# Patient Record
Sex: FEMALE | Race: BLACK OR AFRICAN AMERICAN | NOT HISPANIC OR LATINO | Employment: FULL TIME | URBAN - METROPOLITAN AREA
[De-identification: names, ages, dates, MRNs, and addresses within clinical notes are randomized per-mention and may not be internally consistent; named-entity substitution may affect disease eponyms.]

---

## 2021-02-23 ENCOUNTER — APPOINTMENT (OUTPATIENT)
Dept: GENERAL RADIOLOGY | Facility: CLINIC | Age: 26
DRG: 896 | End: 2021-02-23
Attending: EMERGENCY MEDICINE

## 2021-02-23 ENCOUNTER — APPOINTMENT (OUTPATIENT)
Dept: CT IMAGING | Facility: CLINIC | Age: 26
DRG: 896 | End: 2021-02-23
Attending: EMERGENCY MEDICINE

## 2021-02-23 ENCOUNTER — HOSPITAL ENCOUNTER (INPATIENT)
Facility: CLINIC | Age: 26
LOS: 2 days | Discharge: HOME | DRG: 896 | End: 2021-02-25
Attending: EMERGENCY MEDICINE | Admitting: INTERNAL MEDICINE

## 2021-02-23 DIAGNOSIS — R40.1 STUPOR: ICD-10-CM

## 2021-02-23 DIAGNOSIS — F10.929 ALCOHOLIC INTOXICATION WITH COMPLICATION (H): ICD-10-CM

## 2021-02-23 LAB
ALBUMIN SERPL-MCNC: 4.1 G/DL (ref 3.4–5)
ALBUMIN UR-MCNC: NEGATIVE MG/DL
ALP SERPL-CCNC: 46 U/L (ref 40–150)
ALT SERPL W P-5'-P-CCNC: 26 U/L (ref 0–50)
AMPHETAMINES UR QL SCN: NEGATIVE
ANION GAP SERPL CALCULATED.3IONS-SCNC: 10 MMOL/L (ref 3–14)
ANION GAP SERPL CALCULATED.3IONS-SCNC: 12 MMOL/L (ref 3–14)
APAP SERPL-MCNC: <2 MG/L (ref 10–20)
APPEARANCE UR: CLEAR
AST SERPL W P-5'-P-CCNC: 23 U/L (ref 0–45)
B-HCG FREE SERPL-ACNC: <5 IU/L
BARBITURATES UR QL: NEGATIVE
BASE DEFICIT BLDA-SCNC: 2.4 MMOL/L
BASOPHILS # BLD AUTO: 0 10E9/L (ref 0–0.2)
BASOPHILS NFR BLD AUTO: 0.4 %
BENZODIAZ UR QL: NEGATIVE
BILIRUB SERPL-MCNC: 0.5 MG/DL (ref 0.2–1.3)
BILIRUB UR QL STRIP: NEGATIVE
BUN SERPL-MCNC: 11 MG/DL (ref 7–30)
BUN SERPL-MCNC: 13 MG/DL (ref 7–30)
CALCIUM SERPL-MCNC: 8.1 MG/DL (ref 8.5–10.1)
CALCIUM SERPL-MCNC: 8.9 MG/DL (ref 8.5–10.1)
CANNABINOIDS UR QL SCN: NEGATIVE
CHLORIDE SERPL-SCNC: 105 MMOL/L (ref 94–109)
CHLORIDE SERPL-SCNC: 110 MMOL/L (ref 94–109)
CO2 SERPL-SCNC: 20 MMOL/L (ref 20–32)
CO2 SERPL-SCNC: 21 MMOL/L (ref 20–32)
COCAINE UR QL: NEGATIVE
COLOR UR AUTO: NORMAL
CREAT SERPL-MCNC: 0.7 MG/DL (ref 0.52–1.04)
CREAT SERPL-MCNC: 0.82 MG/DL (ref 0.52–1.04)
DIFFERENTIAL METHOD BLD: ABNORMAL
EOSINOPHIL # BLD AUTO: 0.1 10E9/L (ref 0–0.7)
EOSINOPHIL NFR BLD AUTO: 1.2 %
ERYTHROCYTE [DISTWIDTH] IN BLOOD BY AUTOMATED COUNT: 13.2 % (ref 10–15)
ERYTHROCYTE [DISTWIDTH] IN BLOOD BY AUTOMATED COUNT: 13.2 % (ref 10–15)
ETHANOL SERPL-MCNC: 0.22 G/DL
FLUAV RNA RESP QL NAA+PROBE: NEGATIVE
FLUBV RNA RESP QL NAA+PROBE: NEGATIVE
GFR SERPL CREATININE-BSD FRML MDRD: >90 ML/MIN/{1.73_M2}
GFR SERPL CREATININE-BSD FRML MDRD: >90 ML/MIN/{1.73_M2}
GLUCOSE BLDC GLUCOMTR-MCNC: 84 MG/DL (ref 70–99)
GLUCOSE BLDC GLUCOMTR-MCNC: 94 MG/DL (ref 70–99)
GLUCOSE BLDC GLUCOMTR-MCNC: 98 MG/DL (ref 70–99)
GLUCOSE SERPL-MCNC: 74 MG/DL (ref 70–99)
GLUCOSE SERPL-MCNC: 98 MG/DL (ref 70–99)
GLUCOSE UR STRIP-MCNC: NEGATIVE MG/DL
HCO3 BLD-SCNC: 21 MMOL/L (ref 21–28)
HCT VFR BLD AUTO: 41.8 % (ref 35–47)
HCT VFR BLD AUTO: 46.5 % (ref 35–47)
HGB BLD-MCNC: 13.1 G/DL (ref 11.7–15.7)
HGB BLD-MCNC: 14.3 G/DL (ref 11.7–15.7)
HGB UR QL STRIP: NEGATIVE
IMM GRANULOCYTES # BLD: 0 10E9/L (ref 0–0.4)
IMM GRANULOCYTES NFR BLD: 0.3 %
KETONES UR STRIP-MCNC: NEGATIVE MG/DL
LABORATORY COMMENT REPORT: NORMAL
LEUKOCYTE ESTERASE UR QL STRIP: NEGATIVE
LYMPHOCYTES # BLD AUTO: 1.8 10E9/L (ref 0.8–5.3)
LYMPHOCYTES NFR BLD AUTO: 23.6 %
MAGNESIUM SERPL-MCNC: 2.1 MG/DL (ref 1.6–2.3)
MCH RBC QN AUTO: 25.7 PG (ref 26.5–33)
MCH RBC QN AUTO: 25.8 PG (ref 26.5–33)
MCHC RBC AUTO-ENTMCNC: 30.8 G/DL (ref 31.5–36.5)
MCHC RBC AUTO-ENTMCNC: 31.3 G/DL (ref 31.5–36.5)
MCV RBC AUTO: 82 FL (ref 78–100)
MCV RBC AUTO: 84 FL (ref 78–100)
MONOCYTES # BLD AUTO: 0.5 10E9/L (ref 0–1.3)
MONOCYTES NFR BLD AUTO: 6 %
NEUTROPHILS # BLD AUTO: 5.4 10E9/L (ref 1.6–8.3)
NEUTROPHILS NFR BLD AUTO: 68.5 %
NITRATE UR QL: NEGATIVE
NRBC # BLD AUTO: 0 10*3/UL
NRBC BLD AUTO-RTO: 0 /100
O2/TOTAL GAS SETTING VFR VENT: ABNORMAL %
OPIATES UR QL SCN: NEGATIVE
OXYHGB MFR BLD: 99 % (ref 92–100)
PCO2 BLD: 30 MM HG (ref 35–45)
PCP UR QL SCN: NEGATIVE
PH BLD: 7.45 PH (ref 7.35–7.45)
PH UR STRIP: 5.5 PH (ref 5–7)
PLATELET # BLD AUTO: 202 10E9/L (ref 150–450)
PLATELET # BLD AUTO: 327 10E9/L (ref 150–450)
PO2 BLD: 212 MM HG (ref 80–105)
POTASSIUM SERPL-SCNC: 3.5 MMOL/L (ref 3.4–5.3)
POTASSIUM SERPL-SCNC: 4.2 MMOL/L (ref 3.4–5.3)
PROT SERPL-MCNC: 8.1 G/DL (ref 6.8–8.8)
RBC # BLD AUTO: 5.09 10E12/L (ref 3.8–5.2)
RBC # BLD AUTO: 5.55 10E12/L (ref 3.8–5.2)
RBC #/AREA URNS AUTO: 1 /HPF (ref 0–2)
RSV RNA SPEC QL NAA+PROBE: NORMAL
SALICYLATES SERPL-MCNC: <2 MG/DL
SARS-COV-2 RNA RESP QL NAA+PROBE: NEGATIVE
SODIUM SERPL-SCNC: 138 MMOL/L (ref 133–144)
SODIUM SERPL-SCNC: 140 MMOL/L (ref 133–144)
SOURCE: NORMAL
SP GR UR STRIP: 1.01 (ref 1–1.03)
SPECIMEN SOURCE: NORMAL
UROBILINOGEN UR STRIP-MCNC: NORMAL MG/DL (ref 0–2)
WBC # BLD AUTO: 13.6 10E9/L (ref 4–11)
WBC # BLD AUTO: 7.8 10E9/L (ref 4–11)
WBC #/AREA URNS AUTO: 1 /HPF (ref 0–5)

## 2021-02-23 PROCEDURE — 250N000009 HC RX 250: Performed by: INTERNAL MEDICINE

## 2021-02-23 PROCEDURE — 87636 SARSCOV2 & INF A&B AMP PRB: CPT | Performed by: EMERGENCY MEDICINE

## 2021-02-23 PROCEDURE — 81001 URINALYSIS AUTO W/SCOPE: CPT | Performed by: EMERGENCY MEDICINE

## 2021-02-23 PROCEDURE — 93005 ELECTROCARDIOGRAM TRACING: CPT

## 2021-02-23 PROCEDURE — 999N000105 HC STATISTIC NO DOCUMENTATION TO SUPPORT CHARGE

## 2021-02-23 PROCEDURE — 99291 CRITICAL CARE FIRST HOUR: CPT | Performed by: INTERNAL MEDICINE

## 2021-02-23 PROCEDURE — 80053 COMPREHEN METABOLIC PANEL: CPT | Performed by: EMERGENCY MEDICINE

## 2021-02-23 PROCEDURE — 82805 BLOOD GASES W/O2 SATURATION: CPT | Performed by: EMERGENCY MEDICINE

## 2021-02-23 PROCEDURE — 96365 THER/PROPH/DIAG IV INF INIT: CPT

## 2021-02-23 PROCEDURE — 94002 VENT MGMT INPAT INIT DAY: CPT

## 2021-02-23 PROCEDURE — 99292 CRITICAL CARE ADDL 30 MIN: CPT

## 2021-02-23 PROCEDURE — 84702 CHORIONIC GONADOTROPIN TEST: CPT

## 2021-02-23 PROCEDURE — 99291 CRITICAL CARE FIRST HOUR: CPT | Mod: 25

## 2021-02-23 PROCEDURE — 80048 BASIC METABOLIC PNL TOTAL CA: CPT | Performed by: INTERNAL MEDICINE

## 2021-02-23 PROCEDURE — 80179 DRUG ASSAY SALICYLATE: CPT | Performed by: EMERGENCY MEDICINE

## 2021-02-23 PROCEDURE — 96375 TX/PRO/DX INJ NEW DRUG ADDON: CPT

## 2021-02-23 PROCEDURE — 250N000013 HC RX MED GY IP 250 OP 250 PS 637: Performed by: INTERNAL MEDICINE

## 2021-02-23 PROCEDURE — 999N001017 HC STATISTIC GLUCOSE BY METER IP

## 2021-02-23 PROCEDURE — 96376 TX/PRO/DX INJ SAME DRUG ADON: CPT

## 2021-02-23 PROCEDURE — 0BH17EZ INSERTION OF ENDOTRACHEAL AIRWAY INTO TRACHEA, VIA NATURAL OR ARTIFICIAL OPENING: ICD-10-PCS | Performed by: EMERGENCY MEDICINE

## 2021-02-23 PROCEDURE — 5A1935Z RESPIRATORY VENTILATION, LESS THAN 24 CONSECUTIVE HOURS: ICD-10-PCS | Performed by: EMERGENCY MEDICINE

## 2021-02-23 PROCEDURE — 96366 THER/PROPH/DIAG IV INF ADDON: CPT

## 2021-02-23 PROCEDURE — 250N000011 HC RX IP 250 OP 636: Performed by: EMERGENCY MEDICINE

## 2021-02-23 PROCEDURE — 999N000157 HC STATISTIC RCP TIME EA 10 MIN

## 2021-02-23 PROCEDURE — 31500 INSERT EMERGENCY AIRWAY: CPT

## 2021-02-23 PROCEDURE — 200N000001 HC R&B ICU

## 2021-02-23 PROCEDURE — 70450 CT HEAD/BRAIN W/O DYE: CPT

## 2021-02-23 PROCEDURE — 96368 THER/DIAG CONCURRENT INF: CPT

## 2021-02-23 PROCEDURE — 80307 DRUG TEST PRSMV CHEM ANLYZR: CPT | Performed by: EMERGENCY MEDICINE

## 2021-02-23 PROCEDURE — 999N000185 HC STATISTIC TRANSPORT TIME EA 15 MIN

## 2021-02-23 PROCEDURE — C9803 HOPD COVID-19 SPEC COLLECT: HCPCS

## 2021-02-23 PROCEDURE — 36600 WITHDRAWAL OF ARTERIAL BLOOD: CPT

## 2021-02-23 PROCEDURE — 51702 INSERT TEMP BLADDER CATH: CPT

## 2021-02-23 PROCEDURE — 83735 ASSAY OF MAGNESIUM: CPT | Performed by: EMERGENCY MEDICINE

## 2021-02-23 PROCEDURE — 250N000009 HC RX 250: Performed by: EMERGENCY MEDICINE

## 2021-02-23 PROCEDURE — 82077 ASSAY SPEC XCP UR&BREATH IA: CPT | Performed by: EMERGENCY MEDICINE

## 2021-02-23 PROCEDURE — 36415 COLL VENOUS BLD VENIPUNCTURE: CPT | Performed by: INTERNAL MEDICINE

## 2021-02-23 PROCEDURE — 80143 DRUG ASSAY ACETAMINOPHEN: CPT | Performed by: EMERGENCY MEDICINE

## 2021-02-23 PROCEDURE — 71045 X-RAY EXAM CHEST 1 VIEW: CPT

## 2021-02-23 PROCEDURE — 250N000011 HC RX IP 250 OP 636

## 2021-02-23 PROCEDURE — 85025 COMPLETE CBC W/AUTO DIFF WBC: CPT | Performed by: EMERGENCY MEDICINE

## 2021-02-23 PROCEDURE — 258N000003 HC RX IP 258 OP 636: Performed by: INTERNAL MEDICINE

## 2021-02-23 PROCEDURE — 85027 COMPLETE CBC AUTOMATED: CPT | Performed by: INTERNAL MEDICINE

## 2021-02-23 RX ORDER — ETOMIDATE 2 MG/ML
10 INJECTION INTRAVENOUS ONCE
Status: COMPLETED | OUTPATIENT
Start: 2021-02-23 | End: 2021-02-23

## 2021-02-23 RX ORDER — MULTIPLE VITAMINS W/ MINERALS TAB 9MG-400MCG
1 TAB ORAL DAILY
Status: DISCONTINUED | OUTPATIENT
Start: 2021-02-23 | End: 2021-02-23

## 2021-02-23 RX ORDER — LANOLIN ALCOHOL/MO/W.PET/CERES
100 CREAM (GRAM) TOPICAL DAILY
Status: DISCONTINUED | OUTPATIENT
Start: 2021-02-23 | End: 2021-02-24

## 2021-02-23 RX ORDER — ONDANSETRON 4 MG/1
4 TABLET, ORALLY DISINTEGRATING ORAL EVERY 6 HOURS PRN
Status: DISCONTINUED | OUTPATIENT
Start: 2021-02-23 | End: 2021-02-25 | Stop reason: HOSPADM

## 2021-02-23 RX ORDER — MIDAZOLAM (PF) 1 MG/ML IN 0.9 % SODIUM CHLORIDE INTRAVENOUS SOLUTION
1-8 CONTINUOUS
Status: DISCONTINUED | OUTPATIENT
Start: 2021-02-23 | End: 2021-02-23

## 2021-02-23 RX ORDER — FENTANYL CITRATE 50 UG/ML
100 INJECTION, SOLUTION INTRAMUSCULAR; INTRAVENOUS ONCE
Status: COMPLETED | OUTPATIENT
Start: 2021-02-23 | End: 2021-02-23

## 2021-02-23 RX ORDER — ACETAMINOPHEN 325 MG/1
650 TABLET ORAL EVERY 4 HOURS PRN
Status: DISCONTINUED | OUTPATIENT
Start: 2021-02-23 | End: 2021-02-25 | Stop reason: HOSPADM

## 2021-02-23 RX ORDER — NICOTINE POLACRILEX 4 MG
15-30 LOZENGE BUCCAL
Status: DISCONTINUED | OUTPATIENT
Start: 2021-02-23 | End: 2021-02-25 | Stop reason: HOSPADM

## 2021-02-23 RX ORDER — DEXTROSE MONOHYDRATE 25 G/50ML
25-50 INJECTION, SOLUTION INTRAVENOUS
Status: DISCONTINUED | OUTPATIENT
Start: 2021-02-23 | End: 2021-02-25 | Stop reason: HOSPADM

## 2021-02-23 RX ORDER — FENTANYL CITRATE 50 UG/ML
50 INJECTION, SOLUTION INTRAMUSCULAR; INTRAVENOUS
Status: DISCONTINUED | OUTPATIENT
Start: 2021-02-23 | End: 2021-02-25 | Stop reason: HOSPADM

## 2021-02-23 RX ORDER — CHLORHEXIDINE GLUCONATE ORAL RINSE 1.2 MG/ML
15 SOLUTION DENTAL EVERY 12 HOURS
Status: DISCONTINUED | OUTPATIENT
Start: 2021-02-23 | End: 2021-02-24

## 2021-02-23 RX ORDER — ALBUTEROL SULFATE 90 UG/1
6 AEROSOL, METERED RESPIRATORY (INHALATION) EVERY 4 HOURS PRN
Status: DISCONTINUED | OUTPATIENT
Start: 2021-02-23 | End: 2021-02-25 | Stop reason: HOSPADM

## 2021-02-23 RX ORDER — ONDANSETRON 2 MG/ML
4 INJECTION INTRAMUSCULAR; INTRAVENOUS EVERY 6 HOURS PRN
Status: DISCONTINUED | OUTPATIENT
Start: 2021-02-23 | End: 2021-02-25 | Stop reason: HOSPADM

## 2021-02-23 RX ORDER — SODIUM CHLORIDE 9 MG/ML
INJECTION, SOLUTION INTRAVENOUS CONTINUOUS
Status: DISCONTINUED | OUTPATIENT
Start: 2021-02-23 | End: 2021-02-24

## 2021-02-23 RX ORDER — NALOXONE HYDROCHLORIDE 0.4 MG/ML
0.2 INJECTION, SOLUTION INTRAMUSCULAR; INTRAVENOUS; SUBCUTANEOUS
Status: DISCONTINUED | OUTPATIENT
Start: 2021-02-23 | End: 2021-02-25 | Stop reason: HOSPADM

## 2021-02-23 RX ORDER — NALOXONE HYDROCHLORIDE 0.4 MG/ML
0.4 INJECTION, SOLUTION INTRAMUSCULAR; INTRAVENOUS; SUBCUTANEOUS
Status: DISCONTINUED | OUTPATIENT
Start: 2021-02-23 | End: 2021-02-25 | Stop reason: HOSPADM

## 2021-02-23 RX ORDER — PROPOFOL 10 MG/ML
INJECTION, EMULSION INTRAVENOUS
Status: COMPLETED
Start: 2021-02-23 | End: 2021-02-23

## 2021-02-23 RX ORDER — DEXMEDETOMIDINE HYDROCHLORIDE 4 UG/ML
.2-.7 INJECTION, SOLUTION INTRAVENOUS CONTINUOUS
Status: DISCONTINUED | OUTPATIENT
Start: 2021-02-23 | End: 2021-02-24

## 2021-02-23 RX ORDER — FOLIC ACID 1 MG/1
1 TABLET ORAL DAILY
Status: COMPLETED | OUTPATIENT
Start: 2021-02-23 | End: 2021-02-24

## 2021-02-23 RX ORDER — MIDAZOLAM (PF) 1 MG/ML IN 0.9 % SODIUM CHLORIDE INTRAVENOUS SOLUTION
1-8 CONTINUOUS
Status: DISCONTINUED | OUTPATIENT
Start: 2021-02-23 | End: 2021-02-24

## 2021-02-23 RX ORDER — NALOXONE HYDROCHLORIDE 0.4 MG/ML
0.5 INJECTION, SOLUTION INTRAMUSCULAR; INTRAVENOUS; SUBCUTANEOUS ONCE
Status: COMPLETED | OUTPATIENT
Start: 2021-02-23 | End: 2021-02-23

## 2021-02-23 RX ADMIN — NALOXONE HYDROCHLORIDE 0.5 MG: 0.4 INJECTION, SOLUTION INTRAMUSCULAR; INTRAVENOUS; SUBCUTANEOUS at 15:39

## 2021-02-23 RX ADMIN — FENTANYL CITRATE 100 MCG: 50 INJECTION, SOLUTION INTRAMUSCULAR; INTRAVENOUS at 15:56

## 2021-02-23 RX ADMIN — ETOMIDATE 10 MG: 20 INJECTION, SOLUTION INTRAVENOUS at 15:47

## 2021-02-23 RX ADMIN — FOLIC ACID 1 MG: 1 TABLET ORAL at 21:21

## 2021-02-23 RX ADMIN — PROPOFOL 40 MCG/KG/MIN: 10 INJECTION, EMULSION INTRAVENOUS at 16:28

## 2021-02-23 RX ADMIN — Medication 1 MG/HR: at 16:26

## 2021-02-23 RX ADMIN — SODIUM CHLORIDE: 9 INJECTION, SOLUTION INTRAVENOUS at 19:54

## 2021-02-23 RX ADMIN — PROPOFOL 40 MCG/KG/MIN: 10 INJECTION, EMULSION INTRAVENOUS at 19:32

## 2021-02-23 RX ADMIN — SODIUM CHLORIDE 0.2 MCG/KG/HR: 9 INJECTION, SOLUTION INTRAVENOUS at 21:11

## 2021-02-23 RX ADMIN — MULTIVITAMIN 15 ML: LIQUID ORAL at 21:24

## 2021-02-23 RX ADMIN — THIAMINE HCL TAB 100 MG 100 MG: 100 TAB at 21:21

## 2021-02-23 RX ADMIN — CHLORHEXIDINE GLUCONATE 0.12% ORAL RINSE 15 ML: 1.2 LIQUID ORAL at 21:15

## 2021-02-23 RX ADMIN — MIDAZOLAM 2 MG: 1 INJECTION INTRAMUSCULAR; INTRAVENOUS at 16:13

## 2021-02-23 RX ADMIN — SUCCINYLCHOLINE CHLORIDE 80 MG: 20 INJECTION, SOLUTION INTRAMUSCULAR; INTRAVENOUS at 15:44

## 2021-02-23 ASSESSMENT — MIFFLIN-ST. JEOR: SCORE: 1316

## 2021-02-23 ASSESSMENT — ACTIVITIES OF DAILY LIVING (ADL): ADLS_ACUITY_SCORE: 19

## 2021-02-23 NOTE — ED PROVIDER NOTES
History   Chief Complaint:  Altered Mental Status       The history is provided by the EMS personnel and a significant other. The history is limited by the condition of the patient.      Monica Matos is a 25 year old female who presents unresponsive. EMS report that girlfriend came home and found the patient on the couch. On scene, EMS found a liter bottle of alcohol with some gone. Girlfriend denies other drugs or substances. En route, patient was vomiting, retching, and minimal eye movement. Pupils were reactive to light. EMS deny trauma or suicidal ideation per girlfriend. The patient lives in [place] and is supposed to return tomorrow.     According to her girlfriend, the patient has a history of dissociative identity disorder. She has this person named Pancho who was out crying. The patient was drinking in the park when she messaged her girlfriend to come pick her up. When she got there the patient was intoxicated and throwing up blood. She denies any suicidal remarks.     Review of Systems   Unable to perform ROS: Patient unresponsive       Allergies:  The patient has no known allergies.     Medications:  The patient is not currently taking any prescribed medications.    Past Medical History:    The patient is unable to provide past medical history secondary to intubation.     Social History:  Presents to the ED via EMS.  Lives in [place].   Here visiting girlfriend    Physical Exam     Patient Vitals for the past 24 hrs:   BP Temp Temp src Pulse Resp SpO2 Weight   02/23/21 1730 121/76 97.2  F (36.2  C) -- 73 20 100 % --   02/23/21 1704 120/77 96.6  F (35.9  C) -- 74 20 100 % --   02/23/21 1635 -- 97.7  F (36.5  C) Temporal -- -- -- --   02/23/21 1630 118/74 -- -- 80 20 100 % --   02/23/21 1612 114/74 -- -- 90 20 100 % --   02/23/21 1600 120/76 -- -- 88 -- -- --   02/23/21 1551 -- -- -- 84 -- 100 % --   02/23/21 1550 (!) 168/103 -- -- 99 12 100 % --   02/23/21 1545 (!) 144/95 -- -- 91 (!) 40 100 %  --   02/23/21 1542 -- -- -- -- -- -- 60 kg (132 lb 4.4 oz)   02/23/21 1535 (!) 139/93 -- -- 78 11 100 % --       Physical Exam  General: Unresponsive. Vomit in hair.     HENT: Head and neck atraumatic. Mucous membranes moist. Nasal trumpet in place.     Cardiovascular: Regular rate and rhythm. Good pulses in all four extremities. Normal capillary refill and skin turgor.     Respiratory: Lungs are clear. No nasal flaring. No retractions. No wheezing, no crackles.    Gastrointestinal: Abdomen soft. No guarding, no rebound. No distention. No palpable hernias.     Musculoskeletal: No gross deformity.     Skin: No rashes or petechiae.     Neurologic: GCS 4. No seizure like activity. Minimal tone was present.    Lymphatic: No cervical adenopathy. No lower extremity swelling.    Emergency Department Course   ECG  ECG taken at 1540, ECG read at 1550  Normal sinus rhythm. Right atrial enlargement. Borderline ECG.  Rate 87 bpm. DC interval 168 ms. QRS duration 88 ms. QT/QTc 340/409 ms. P-R-T axes 84 80 36.     Imaging:  XR Chest, Portable, G/E 1 view:   Portable chest. Lungs are clear. Nasogastric tube extends  below left hemidiaphragm presumably in the stomach. Endotracheal tube  terminates approximately 3 cm above the douglas in good position. As per radiology.    CT Head without contrast:   No acute intracranial process. As per radiology.    Laboratory:   CBC: WBC: 7.8, HGB: 14.3, PLT: 327    CMP: Glucose 98 o/w WNL (Creatinine: 98)    Glucose by meter: 94    VBG and oxyhgb: pH 7.45 / PCO2 30 (L) / PO2 212 (H) / Bicarb 21 / FlO2 40 % / Oxyhemoglobin 99 / Base excess 2.4    ISTAT HCG quantitative pregnancy POCT: <5.0    Alcohol ethyl: 0.22 (H)    Acetaminophen Level: <2    Salicylate Level: <2    UA: Negative    Drug Abuse Screen Urine: Negative    Symptomatic Influenza A/B antigen & COVID-19 PCR: Negative    Interventions:  1539 Narcan 0.5 mg IV  1544 Anectine 80 mg IV  1547 Amidate 10 mg IV  1556 Sublimaze 100 mcg  "IV  1613 Versed 2 mg IV  1626 Versed drip 1 mg/hr IV  1628 Propofol 40 mcg/kg/min infusion  1704 Versed drip 2 mg/hr IV  1740 Diprivan 30 mcg/kg/min infusion  1806 Diprivan 35 mcg/kg/min infusion    Procedures     Intubation      INDICATION: Airway protection.    PERFORMED BY: Dr. Steve Palacios    CONSENT: The procedure was performed in an emergent situation.    TIMEOUT: Immediately prior to procedure a \"time out\" was called to verify the correct patient, procedure, equipment, support staff and site/side marked as required.    INTUBATION METHOD: Glidescope    PATIENT STATUS: Unconscious    PREOXYGENATION: Mask    PRETREATMENT MEDICATIONS: None    SEDATIVES: Etomidate, Fentanyl, Midazolam (Versed) and Propofol    PARALYTIC: succinylcholine    LARYNGOSCOPE SIZE: Mac 3    TUBE SIZE: 7.5 uncuffed  Number of attempts: 2  Cricoid pressure: yes  Cords visualized: yes    POST-PROCEDURE ASSESSMENT: Breath sounds equal bilaterally with chest rise and absent over the epigastrium, Chest x-ray interpreted by me demonstrating endotracheal tube in appropriate position and CO2 detector.    ETT TO TEETH: 25 cm  Tube secured with: ETT plaza    Patient tolerated the procedure well with no immediate complications.  COMPLICATIONS:  None      Emergency Department Course:  1530 Red Team Activated    1533 Initial examination of the patient.     1537 IV placed    1539 0.5 Narcan given    1540 EKG done    1541 BS 98    1543 Temp 97.7    1544 Anectine 80 mg    1545 Intubation first attempt. Patient tried to vomit    1547 Etomidate 10 mg     1550 2nd intubation attempt    1628 Propofol infusion started    1709 GirlfriendSandrita, at bedside    1757 Spoke with Dr. Mehta, Hospitalist, who accepts the patient.     Disposition:  The patient was admitted to the hospital under the care of Dr. Mehta.     Impression & Plan     Covid-19  Monica Matos was evaluated during a global COVID-19 pandemic, which necessitated consideration that " the patient might be at risk for infection with the SARS-CoV-2 virus that causes COVID-19.   Applicable protocols for evaluation were followed during the patient's care.   COVID-19 was considered as part of the patient's evaluation. The plan for testing is:  a test was obtained during this visit.    Medical Decision Making:  The patient was essentially obtunded on presentation.  The patient's girlfriend was not available for the original history but EMS had said that there was report drinking alcohol we did not know if there had been any suicide attempt and therefore a broad array of labs were checked.  There was no outward signs of trauma but I felt a CT scan was important to perform due to the potential for an intracranial process.  I considered encephalitis or meningitis but felt this is unlikely.  The patient during the required intubation to protect the airway did have a little bit increased alertness but not enough to protect the airway.  There had been a report of vomiting and that it was therefore concerned that the airway could be in danger in the ED tube was placed successfully.  The patient did not respond to Narcan though there was report of the use of marijuana the girlfriend that when I talked to later had said that there had been no other drugs used.  There is no known report of suicidal behavior patient was admitted to the ICU intubated with a high alcohol level.  The assumption is the alcohol is the cause behind this but they did not improve further testing may be required.  But I do not think is likely a stroke PE seizure or other significant event other than intoxication.  The patient had been drinking quite heavily per the history.      Critical Care Time: was 75 minutes for this patient excluding procedures    Diagnosis:    ICD-10-CM    1. Stupor  R40.1    2. Alcoholic intoxication with complication (H)  F10.929        Scribe Disclosure:  I, Cheikh Harrington, am serving as a scribe at 3:51 PM on  2/23/2021 to document services personally performed by Steve Palacios MD based on my observations and the provider's statements to me.            Steve Palacios MD  02/23/21 2129

## 2021-02-23 NOTE — LETTER
[facility] ICU  [address] [place] [address]  [phone]    2021    Re: Monica Matos  [address] [address]  [phone] (home)     : 1995      To Whom It May Concern:      Monica Matos was hospitalized from 2021 until 2021 due to medical illness.  She may return to work on 21 with full duty.        Sincerely,        Jeremy Delong MD

## 2021-02-23 NOTE — ED TRIAGE NOTES
Pt presents to ED via EMS after pts girlfriend called 911 finding pt unresponsive on couch.    EMS reports alcohol bottles around, no evidence of drugs.    Pt arrives unresponsive to verbal stimuli with nasal trumpet in place     per EMS

## 2021-02-23 NOTE — ED NOTES
Patient's girlfriend in room to see pt    Sandrita Kelley (girlfriend) would like updates [phone]

## 2021-02-24 LAB
ALBUMIN SERPL-MCNC: 3.4 G/DL (ref 3.4–5)
ALBUMIN UR-MCNC: 10 MG/DL
ALP SERPL-CCNC: 37 U/L (ref 40–150)
ALT SERPL W P-5'-P-CCNC: 22 U/L (ref 0–50)
APPEARANCE UR: CLEAR
AST SERPL W P-5'-P-CCNC: 18 U/L (ref 0–45)
BASE DEFICIT BLDA-SCNC: 0.7 MMOL/L
BILIRUB DIRECT SERPL-MCNC: 0.2 MG/DL (ref 0–0.2)
BILIRUB SERPL-MCNC: 0.7 MG/DL (ref 0.2–1.3)
BILIRUB UR QL STRIP: NEGATIVE
BUN SERPL-MCNC: 13 MG/DL (ref 7–30)
CALCIUM SERPL-MCNC: 8.1 MG/DL (ref 8.5–10.1)
CHLORIDE SERPL-SCNC: 111 MMOL/L (ref 94–109)
CO2 SERPL-SCNC: 23 MMOL/L (ref 20–32)
COLOR UR AUTO: YELLOW
CREAT SERPL-MCNC: 0.8 MG/DL (ref 0.52–1.04)
ERYTHROCYTE [DISTWIDTH] IN BLOOD BY AUTOMATED COUNT: 13.5 % (ref 10–15)
GFR SERPL CREATININE-BSD FRML MDRD: >90 ML/MIN/{1.73_M2}
GLUCOSE BLDC GLUCOMTR-MCNC: 107 MG/DL (ref 70–99)
GLUCOSE BLDC GLUCOMTR-MCNC: 71 MG/DL (ref 70–99)
GLUCOSE BLDC GLUCOMTR-MCNC: 80 MG/DL (ref 70–99)
GLUCOSE BLDC GLUCOMTR-MCNC: 81 MG/DL (ref 70–99)
GLUCOSE BLDC GLUCOMTR-MCNC: 90 MG/DL (ref 70–99)
GLUCOSE SERPL-MCNC: 84 MG/DL (ref 70–99)
GLUCOSE UR STRIP-MCNC: NEGATIVE MG/DL
HCO3 BLD-SCNC: 24 MMOL/L (ref 21–28)
HCT VFR BLD AUTO: 41.4 % (ref 35–47)
HGB BLD-MCNC: 12.5 G/DL (ref 11.7–15.7)
HGB UR QL STRIP: NEGATIVE
INTERPRETATION ECG - MUSE: NORMAL
KETONES UR STRIP-MCNC: 10 MG/DL
LEUKOCYTE ESTERASE UR QL STRIP: NEGATIVE
MAGNESIUM SERPL-MCNC: 1.8 MG/DL (ref 1.6–2.3)
MCH RBC QN AUTO: 25.6 PG (ref 26.5–33)
MCHC RBC AUTO-ENTMCNC: 30.2 G/DL (ref 31.5–36.5)
MCV RBC AUTO: 85 FL (ref 78–100)
MUCOUS THREADS #/AREA URNS LPF: PRESENT /LPF
NITRATE UR QL: NEGATIVE
O2/TOTAL GAS SETTING VFR VENT: ABNORMAL %
OXYHGB MFR BLD: 98 % (ref 92–100)
PCO2 BLD: 39 MM HG (ref 35–45)
PH BLD: 7.4 PH (ref 7.35–7.45)
PH UR STRIP: 5.5 PH (ref 5–7)
PHOSPHATE SERPL-MCNC: 2.9 MG/DL (ref 2.5–4.5)
PLATELET # BLD AUTO: 285 10E9/L (ref 150–450)
PO2 BLD: 152 MM HG (ref 80–105)
POTASSIUM SERPL-SCNC: 3.9 MMOL/L (ref 3.4–5.3)
PROCALCITONIN SERPL-MCNC: <0.05 NG/ML
PROT SERPL-MCNC: 6.6 G/DL (ref 6.8–8.8)
RBC # BLD AUTO: 4.89 10E12/L (ref 3.8–5.2)
RBC #/AREA URNS AUTO: 3 /HPF (ref 0–2)
SODIUM SERPL-SCNC: 141 MMOL/L (ref 133–144)
SOURCE: ABNORMAL
SP GR UR STRIP: 1.03 (ref 1–1.03)
SQUAMOUS #/AREA URNS AUTO: <1 /HPF (ref 0–1)
UROBILINOGEN UR STRIP-MCNC: NORMAL MG/DL (ref 0–2)
WBC # BLD AUTO: 15.2 10E9/L (ref 4–11)
WBC #/AREA URNS AUTO: 5 /HPF (ref 0–5)

## 2021-02-24 PROCEDURE — 81001 URINALYSIS AUTO W/SCOPE: CPT | Performed by: INTERNAL MEDICINE

## 2021-02-24 PROCEDURE — 94003 VENT MGMT INPAT SUBQ DAY: CPT

## 2021-02-24 PROCEDURE — 84145 PROCALCITONIN (PCT): CPT | Performed by: INTERNAL MEDICINE

## 2021-02-24 PROCEDURE — 250N000013 HC RX MED GY IP 250 OP 250 PS 637: Performed by: INTERNAL MEDICINE

## 2021-02-24 PROCEDURE — 80076 HEPATIC FUNCTION PANEL: CPT | Performed by: INTERNAL MEDICINE

## 2021-02-24 PROCEDURE — 82805 BLOOD GASES W/O2 SATURATION: CPT | Performed by: SURGERY

## 2021-02-24 PROCEDURE — 87040 BLOOD CULTURE FOR BACTERIA: CPT | Performed by: INTERNAL MEDICINE

## 2021-02-24 PROCEDURE — 80048 BASIC METABOLIC PNL TOTAL CA: CPT | Performed by: INTERNAL MEDICINE

## 2021-02-24 PROCEDURE — 36415 COLL VENOUS BLD VENIPUNCTURE: CPT | Performed by: INTERNAL MEDICINE

## 2021-02-24 PROCEDURE — C9113 INJ PANTOPRAZOLE SODIUM, VIA: HCPCS | Performed by: INTERNAL MEDICINE

## 2021-02-24 PROCEDURE — 85027 COMPLETE CBC AUTOMATED: CPT | Performed by: INTERNAL MEDICINE

## 2021-02-24 PROCEDURE — 36600 WITHDRAWAL OF ARTERIAL BLOOD: CPT

## 2021-02-24 PROCEDURE — 250N000011 HC RX IP 250 OP 636: Performed by: INTERNAL MEDICINE

## 2021-02-24 PROCEDURE — 999N000157 HC STATISTIC RCP TIME EA 10 MIN

## 2021-02-24 PROCEDURE — 83735 ASSAY OF MAGNESIUM: CPT | Performed by: INTERNAL MEDICINE

## 2021-02-24 PROCEDURE — 258N000003 HC RX IP 258 OP 636: Performed by: INTERNAL MEDICINE

## 2021-02-24 PROCEDURE — 999N001017 HC STATISTIC GLUCOSE BY METER IP

## 2021-02-24 PROCEDURE — 99233 SBSQ HOSP IP/OBS HIGH 50: CPT | Performed by: INTERNAL MEDICINE

## 2021-02-24 PROCEDURE — 84100 ASSAY OF PHOSPHORUS: CPT | Performed by: INTERNAL MEDICINE

## 2021-02-24 PROCEDURE — 200N000001 HC R&B ICU

## 2021-02-24 PROCEDURE — 250N000009 HC RX 250: Performed by: INTERNAL MEDICINE

## 2021-02-24 RX ORDER — FLUMAZENIL 0.1 MG/ML
0.2 INJECTION, SOLUTION INTRAVENOUS
Status: DISCONTINUED | OUTPATIENT
Start: 2021-02-24 | End: 2021-02-25 | Stop reason: HOSPADM

## 2021-02-24 RX ORDER — GABAPENTIN 600 MG/1
1200 TABLET ORAL ONCE
Status: COMPLETED | OUTPATIENT
Start: 2021-02-24 | End: 2021-02-24

## 2021-02-24 RX ORDER — OLANZAPINE 5 MG/1
5-10 TABLET, ORALLY DISINTEGRATING ORAL EVERY 6 HOURS PRN
Status: DISCONTINUED | OUTPATIENT
Start: 2021-02-24 | End: 2021-02-25 | Stop reason: HOSPADM

## 2021-02-24 RX ORDER — LANOLIN ALCOHOL/MO/W.PET/CERES
200 CREAM (GRAM) TOPICAL 3 TIMES DAILY
Status: DISCONTINUED | OUTPATIENT
Start: 2021-02-24 | End: 2021-02-25 | Stop reason: HOSPADM

## 2021-02-24 RX ORDER — FOLIC ACID 1 MG/1
1 TABLET ORAL DAILY
Status: DISCONTINUED | OUTPATIENT
Start: 2021-02-24 | End: 2021-02-25 | Stop reason: HOSPADM

## 2021-02-24 RX ORDER — MULTIPLE VITAMINS W/ MINERALS TAB 9MG-400MCG
1 TAB ORAL DAILY
Status: DISCONTINUED | OUTPATIENT
Start: 2021-02-24 | End: 2021-02-25 | Stop reason: HOSPADM

## 2021-02-24 RX ORDER — GABAPENTIN 300 MG/1
600 CAPSULE ORAL EVERY 8 HOURS
Status: DISCONTINUED | OUTPATIENT
Start: 2021-02-27 | End: 2021-02-24

## 2021-02-24 RX ORDER — GABAPENTIN 100 MG/1
100 CAPSULE ORAL EVERY 8 HOURS
Status: DISCONTINUED | OUTPATIENT
Start: 2021-03-03 | End: 2021-02-24

## 2021-02-24 RX ORDER — LORAZEPAM 2 MG/ML
1-2 INJECTION INTRAMUSCULAR EVERY 30 MIN PRN
Status: DISCONTINUED | OUTPATIENT
Start: 2021-02-24 | End: 2021-02-25 | Stop reason: HOSPADM

## 2021-02-24 RX ORDER — LANOLIN ALCOHOL/MO/W.PET/CERES
100 CREAM (GRAM) TOPICAL DAILY
Status: DISCONTINUED | OUTPATIENT
Start: 2021-03-03 | End: 2021-02-25 | Stop reason: HOSPADM

## 2021-02-24 RX ORDER — CLONIDINE HYDROCHLORIDE 0.1 MG/1
0.1 TABLET ORAL EVERY 8 HOURS
Status: DISCONTINUED | OUTPATIENT
Start: 2021-02-24 | End: 2021-02-24

## 2021-02-24 RX ORDER — HALOPERIDOL 5 MG/ML
2.5-5 INJECTION INTRAMUSCULAR EVERY 6 HOURS PRN
Status: DISCONTINUED | OUTPATIENT
Start: 2021-02-24 | End: 2021-02-25 | Stop reason: HOSPADM

## 2021-02-24 RX ORDER — THIAMINE HYDROCHLORIDE 100 MG/ML
100 INJECTION, SOLUTION INTRAMUSCULAR; INTRAVENOUS DAILY
Status: DISCONTINUED | OUTPATIENT
Start: 2021-02-24 | End: 2021-02-24

## 2021-02-24 RX ORDER — AMOXICILLIN 500 MG
1200 CAPSULE ORAL DAILY
COMMUNITY

## 2021-02-24 RX ORDER — LANOLIN ALCOHOL/MO/W.PET/CERES
100 CREAM (GRAM) TOPICAL 3 TIMES DAILY
Status: DISCONTINUED | OUTPATIENT
Start: 2021-02-26 | End: 2021-02-25 | Stop reason: HOSPADM

## 2021-02-24 RX ORDER — LORAZEPAM 1 MG/1
1-2 TABLET ORAL EVERY 30 MIN PRN
Status: DISCONTINUED | OUTPATIENT
Start: 2021-02-24 | End: 2021-02-25 | Stop reason: HOSPADM

## 2021-02-24 RX ORDER — GABAPENTIN 300 MG/1
300 CAPSULE ORAL EVERY 8 HOURS
Status: DISCONTINUED | OUTPATIENT
Start: 2021-03-01 | End: 2021-02-24

## 2021-02-24 RX ORDER — GABAPENTIN 300 MG/1
900 CAPSULE ORAL EVERY 8 HOURS
Status: DISCONTINUED | OUTPATIENT
Start: 2021-02-24 | End: 2021-02-24

## 2021-02-24 RX ADMIN — FENTANYL CITRATE 50 MCG: 50 INJECTION, SOLUTION INTRAMUSCULAR; INTRAVENOUS at 04:02

## 2021-02-24 RX ADMIN — THIAMINE HCL TAB 100 MG 200 MG: 100 TAB at 09:03

## 2021-02-24 RX ADMIN — GABAPENTIN 1200 MG: 600 TABLET, FILM COATED ORAL at 08:49

## 2021-02-24 RX ADMIN — CHLORHEXIDINE GLUCONATE 0.12% ORAL RINSE 15 ML: 1.2 LIQUID ORAL at 08:06

## 2021-02-24 RX ADMIN — FENTANYL CITRATE 50 MCG: 50 INJECTION, SOLUTION INTRAMUSCULAR; INTRAVENOUS at 02:58

## 2021-02-24 RX ADMIN — SODIUM CHLORIDE 0.7 MCG/KG/HR: 9 INJECTION, SOLUTION INTRAVENOUS at 06:16

## 2021-02-24 RX ADMIN — FOLIC ACID 1 MG: 1 TABLET ORAL at 08:07

## 2021-02-24 RX ADMIN — PANTOPRAZOLE SODIUM 40 MG: 40 INJECTION, POWDER, FOR SOLUTION INTRAVENOUS at 08:06

## 2021-02-24 RX ADMIN — MULTIPLE VITAMINS W/ MINERALS TAB 1 TABLET: TAB at 08:49

## 2021-02-24 RX ADMIN — MULTIVITAMIN 15 ML: LIQUID ORAL at 08:06

## 2021-02-24 RX ADMIN — FENTANYL CITRATE 50 MCG: 50 INJECTION, SOLUTION INTRAMUSCULAR; INTRAVENOUS at 00:35

## 2021-02-24 RX ADMIN — CLONIDINE HYDROCHLORIDE 0.1 MG: 0.1 TABLET ORAL at 08:49

## 2021-02-24 RX ADMIN — FOLIC ACID 1 MG: 1 TABLET ORAL at 08:49

## 2021-02-24 RX ADMIN — SODIUM CHLORIDE: 9 INJECTION, SOLUTION INTRAVENOUS at 05:07

## 2021-02-24 RX ADMIN — THIAMINE HCL TAB 100 MG 200 MG: 100 TAB at 21:23

## 2021-02-24 RX ADMIN — FENTANYL CITRATE 50 MCG: 50 INJECTION, SOLUTION INTRAMUSCULAR; INTRAVENOUS at 05:20

## 2021-02-24 ASSESSMENT — ACTIVITIES OF DAILY LIVING (ADL)
ADLS_ACUITY_SCORE: 14
ADLS_ACUITY_SCORE: 18
ADLS_ACUITY_SCORE: 18
ADLS_ACUITY_SCORE: 14
ADLS_ACUITY_SCORE: 14
ADLS_ACUITY_SCORE: 18

## 2021-02-24 ASSESSMENT — MIFFLIN-ST. JEOR: SCORE: 1325

## 2021-02-24 NOTE — PROGRESS NOTES
[facility] ICU VENTILATOR RESPIRATORY NOTE  Date of Admission: 2/23  Date of Intubation (most recent): 2/23  Reason for Mechanical Ventilation: Airway protection   Number of Days on Mechanical Ventilation: 2  Met Criteria for Pressure Support Trial: no  Length of Pressure Support Trial: NA   Reason for Stopping Pressure Support Trial: no   Reason for No Pressure Support Trial: Per MD plan is to keep sedated until Am   Significant Events Today: None   ABG Results: 7.40/39/152/24  ETT appearance on chest x-ray: clear and equal     Plan:  Plan is to decrease sedation in am when orders are given by MD and then  Wean to extubate

## 2021-02-24 NOTE — PLAN OF CARE
Bilat wrist restraints discontinued at 9:00 AM on 2/24/2021.    Restraint discontinue criteria met, patient is calm, cooperative and safe. Restraints removed.     Patient's Response: Demonstrates understanding     Attending Physician Notified: MD ordered restraint, Attending Physician's Name: Porter Bates RN

## 2021-02-24 NOTE — PHARMACY-ADMISSION MEDICATION HISTORY
Admission medication history interview status for this patient is complete. See [facility] admission navigator for allergy information, prior to admission medications and immunization status.     Medication history interview done, indicate source(s): Patient  Medication history resources (including written lists, pill bottles, clinic record):None      Changes made to PTA medication list:  Added: all  Deleted: none  Changed: none    Actions taken by pharmacist (provider contacted, etc):None     Additional medication history information:None    Medication reconciliation/reorder completed by provider prior to medication history?  Y   (Y/N)          Prior to Admission medications    Medication Sig Last Dose Taking? Auth Provider   Omega-3 Fatty Acids (FISH OIL) 1200 MG capsule Take 1,200 mg by mouth daily 2/23/2021 at Unknown time Yes Unknown, Entered By History   testosterone cypionate (DEPOTESTOSTERONE) 100 MG/ML injection Inject 25 mg into the muscle once a week 2/23/2021 at Unknown time Yes Unknown, Entered By History     Addendum: Testosterone dose unclear, pt wasn't sure of strength. If this is reordered inpt we will investigate further.

## 2021-02-24 NOTE — PLAN OF CARE
ICU End of Shift Summary.  For vital signs and complete assessments, please see documentation flowsheets.     Pertinent assessments: Pt remains vented throughout shift. Inconsistently following commands at the beginning of the shift only squeezing hands intermittently when restless/agitated when asked. More awake by the end of the shift able to write to staff to make needs known.  Temp of 101.1 via Burns temp probe - MDs aware. UA order placed and sent. BP soft at times with increased sedation. No interventions needed. Tele SR/ST when agitated. Burns with adequate urine output.   Major Shift Events: Very agitated/restless when first admitted - increased Prop and Versed with improvement. Admitting MD wanted to switch to Precedex only. Talked with Tele-Hub MD about patient requiring more sedation meds due to increased agitation with plan to continue Versed and switch to Precedex from Propofol. Unable to wean Propofol off safely until 2300. Pt continued to get intermittently agitated and would attempt to self-extubate. Given IV Fentanyl PRN to meet RASS goal with plan to extubate today.              - Girlfriend, Sandrita, called around 2245 after getting an update from the PA around 2000. Informed her writer was unable to give her an update due to no contacts being in the chart not being able to confirm with Rhina it was ok. She stated she was in the ED with him and knew about the ventilator. Informed her he was stable on that with the plan to get it out today if he continues to be stable. Asked Sandrita if anybody else was notified. She said Rhina has a mom, but she does not have her number at all so she was the only one that knew Rhina was here. Staff needs to give her a call when Rhina is able to clarify it is ok to give her updates.       - R AC IV infiltrated with IVF running. Warm compress placed and arm elevated with swelling gone by AM.  Plan (Upcoming Events): Continue vent support with plan to extubate  today. Sedation with Versed, Precedex, and PRN Fentanyl to meet RASS goal of -2 to -3. Update Sandrita when Brittan able to okay.  Discharge/Transfer Needs: Home when stable. Continue ICU cares at this time.    Bedside Shift Report Completed   Bedside Safety Check Completed

## 2021-02-24 NOTE — PROGRESS NOTES
RT- Patient extubated at 0900 to room air without immediate complications per MD order.     Salvador Simons, RT on 2/24/2021 at 9:55 AM

## 2021-02-24 NOTE — H&P
"[facility]  Hospitalist Admission Note  Name: Monica Matos    MRN: [id number]  YOB: 1995    Age: 25 year old  Date of admission: 2/23/2021  Primary care provider: No primary care provider on file.    Chief Complaint:  AMS    Assessment and Plan:   #Acute Metabolic Encephalopathy  #EtOH intoxication  Pt was found with altered mental status by his girlfriend. EtOH level on admission was 0.22 and UDS was negative for other substances. No acetaminophen or salicylates detected. Other labs including CBC, CMP, and UA were normal. Head CT showed no acute intracranial process. Unclear if AMS is exclusively from EtOH intoxication.  - Thiamine 100mg QD  - Sedated on propofol and midazolam  - mIVF  - CIWA    #Acute Respiratory Failure On Ventilator  Pt was saturating well on admission but was intubated secondary to GCS of 4 and inability to protect his airway.  -Daily weaning trials    #Dissociative Identity  Disorder  Per pt's girlfriend, pt has DID. However, he is from [place] and there are no records related to his care or medications in care everywhere.      DVT Prophylaxis: Pneumatic Compression Devices  Code Status: Full Code  Discharge Dispo: Pending medical stability  Estimated Disch Date / # of Days until Disch: Unknown      History of Present Illness:  Monica Matos is a 25 year old female with PMH including dissociative identity disorder who was brought to the ED by EMS after being found with AMS. History was limited as pt is currently intubated and sedated. Per chart review, the pt called his girlfriend to come pick him up and when she got there he was intoxicated. EMS was called and found empty bottle on arrival. Pt vomited en route. He has a GCS of 4 and was intubated in the ED. Pt identifies as male and goes by \"Rhina\".      Past Medical History:  No past medical history on file.  Past Surgical History:  No past surgical history on file.  Social History:  Social " History     Tobacco Use     Smoking status: Not on file   Substance Use Topics     Alcohol use: Not on file     Social History     Social History Narrative     Not on file     Family History:  No family history on file.  Allergies:  Not on File  Medications:  No medications prior to admission.     Review of Systems:  A Comprehensive greater than 10 system review of systems was carried out.  Pertinent positives and negatives are noted above.  Otherwise negative for contributory information.     Physical Exam:  Blood pressure 124/78, pulse 93, temperature 98.4  F (36.9  C), resp. rate 21, weight 60 kg (132 lb 4.4 oz), SpO2 100 %.  Wt Readings from Last 1 Encounters:   02/23/21 60 kg (132 lb 4.4 oz)     Exam:  General: Sedated on propofol and versed.  HEENT: NC/AT, eyes anicteric, face symmetric.  Dentition WNL, MM moist.  Cardiac: RRR, S1, S2.  No murmurs appreciated.  Pulmonary: Normal chest rise, normal work of breathing.  Lungs CTA BL  Abdomen: soft, non-tender, non-distended.  Bowel Sounds Present.  No guarding.  Extremities: no deformities.  Warm, well perfused.  Skin: no rashes or lesions noted.  Warm and Dry.  Neuro: No focal deficits noted.  Speech clear.  Coordination and strength grossly normal.  Psych: Appropriate affect.    Data:  EKG:  Normal sinus rhythm with right atrial enlargement  Imaging:  Recent Results (from the past 24 hour(s))   XR Chest Port 1 View    Narrative    CHEST PORTABLE ONE VIEW   2/23/2021 4:11 PM     HISTORY: Evaluate nasogastric tube and endotracheal tube placement.    COMPARISON: None.      Impression    IMPRESSION: Portable chest. Lungs are clear. Nasogastric tube extends  below left hemidiaphragm presumably in the stomach. Endotracheal tube  terminates approximately 3 cm above the douglas in good position.    DALILA CROOKS MD   Head CT w/o contrast    Narrative    EXAM: CT HEAD W/O CONTRAST  LOCATION: [facility]  DATE/TIME: 2/23/2021 4:43 PM    INDICATION: Mental  status change, unknown cause. Unresponsive.  COMPARISON: None.  TECHNIQUE: Routine CT Head without IV contrast. Multiplanar reformats. Dose reduction techniques were used.    FINDINGS:  INTRACRANIAL CONTENTS: No intracranial hemorrhage, extraaxial collection, or mass effect.  No CT evidence of acute infarct. Normal parenchymal attenuation. Normal ventricles and sulci.     VISUALIZED ORBITS/SINUSES/MASTOIDS: No intraorbital abnormality. No significant sinus mucosal disease. Partial visualization of nasoenteric tube. Scattered secretions within the nasal cavity and nasopharynx. No middle ear or mastoid effusion.    BONES/SOFT TISSUES: No acute abnormality.      Impression    IMPRESSION:  1.  No acute intracranial process.     Labs:  Recent Labs   Lab 02/23/21  1538   WBC 7.8   HGB 14.3   HCT 46.5   MCV 84        Recent Labs   Lab 02/23/21  1538      POTASSIUM 3.5   CHLORIDE 105   CO2 21   ANIONGAP 12   GLC 98   BUN 13   CR 0.82   GFRESTIMATED >90   GFRESTBLACK >90   ABBY 8.9   PROTTOTAL 8.1   ALBUMIN 4.1   BILITOTAL 0.5   ALKPHOS 46   AST 23   ALT 26     Tanika Euceda, MS4

## 2021-02-24 NOTE — PLAN OF CARE
ICU End of Shift Summary.  For vital signs and complete assessments, please see documentation flowsheets.     Pertinent assessments: Extubated at 0900 today to RA. Lungs clear. Denies SOB, pain. Except for throat pain post intubation - ice and cold utilized. Low grade temp 99.4 remains and MDs notified. Blood cultures were drawn today. Burns removed. Education provided r/t CAUTIs. Pt cannot remember the events since he was brought in. Reoriented, and pt now A&Ox4. Advanced to Regular diet after tolerating clears. Sandrita, girlfriend of pt, was at bedside today this morning for support. POC reviewed. Transfer to floor.  Major Shift Events: Extubated  Plan (Upcoming Events): transfer to floor  Discharge/Transfer Needs: tbd    Bedside Shift Report Completed : y  Bedside Safety Check Completed: y

## 2021-02-24 NOTE — PROGRESS NOTES
Patient came in unresponsive. Pt got intubated for airway protection with #7.5ETT secured 24 at the teeth without any complication. Placed pt on mechanical ventilator initially with the following settings: CMV 20/400/+5/40% and change to this post ABG result: Ventilation Mode: CMV/AC  (Continuous Mandatory Ventilation/ Assist Control)  FiO2 (%): 30 %  Rate Set (breaths/minute): (S) 16 breaths/min  Tidal Volume Set (mL): 400 mL  PEEP (cm H2O): 5 cmH2O  Oxygen Concentration (%): (S) 30 %  Resp: (!) 35    SpO2 100%, BS clear, suction for moderate cloudy thick secretion. Transport pt to and from CT on ventilator without any complication. Will continue to monitor pt's respiratory status closely.    Manasa Rey, RT, RT  2/23/2021 6:58 PM

## 2021-02-24 NOTE — ED NOTES
An ABG was completed on the pt's right radial @ 1806 on an FIO2 of 40% via ventilator with no complications noted during the procedure.    Manasa Rey, RT, RT  2/23/2021 6:15 PM

## 2021-02-24 NOTE — PROGRESS NOTES
"[facility]    Medicine Progress Note - Hospitalist Service       Date of Admission:  2/23/2021  Assessment & Plan        Monica Matos is a 25 year old female with past medical history of dissociative identity disorder who was brought to the emergency department by EMS after being found with altered mental status.  Much of the history is obtained from the emergency department physician secondary to the patient's intubation and sedation.  The patient notably identifies as a male and goes by \"Britsilva\".  The patient also has a known history of dissociative identity disorder with a alternative persona named Pancho.  Per the patient's girlfriend, the patient was identified as Pancho today and was drinking alcohol in the park.  He called his girlfriend to pick him up and when she arrived she found him very intoxicated.  She took him home and he proceeded to become significantly altered and vomited blood so his girlfriend called EMS.  The patient continued to vomit in route and upon arrival had a GCS of 4 so the patient was intubated.  The patient is originally from [place] and was scheduled to fly back to [place] tomorrow.     In the emergency department the patient did have a alcohol level of 0.22 upon arrival.  Urine drug screen was drawn and was negative.  The patient also had a CT head which was unremarkable.  The patient also had a hemoglobin of 14.3 and nasogastric tube contents with scant blood.  The patient was intubated in the emergency department and admitted to the ICU.    1.  Acute Ventilator Dependent Respiratory Failure  - Secondary to #2  - Vent mgmt per Intensivist  - likely extubate today.     2.  Acute Toxic Encephalopathy  - Secondary to #3  - Neuro checks     3.  Acute Alcohol Intoxication  - Etoh level 0.22 on arrival  - Continue propofol and precedex, wean sedation as able, likely able to extubate in AM  - CIWA  - MV, FA, Thiamine  - IVF  - Consult Social Work for " chemical dependency     4.  Hematemesis  - Likely alcoholic gastritis   - Scant NG tube output in ED  - PPI IV  - Recheck Hgb in AM     5.  Dissociative Identity Disorder  - Would benefit from psych consult once extubated    6. Fever.  - Will pan-culture.           Diet: NPO for Medical/Clinical Reasons Except for: Meds    DVT Prophylaxis: Pneumatic Compression Devices  Burns Catheter: in place, indication: Strict 1-2 Hour I&O  Code Status: Full Code           Disposition Plan   Expected discharge: 2 - 3 days, recommended to prior living arrangement once mental status at baseline.  Entered: Jeremy Delong MD 02/24/2021, 8:00 AM       The patient's care was discussed with the Bedside Nurse.    Jeremy Delong MD  Hospitalist Service  [facility]  Contact information available via [facility] Paging/Directory    ______________________________________________________________________    Interval History     + fever. Intubated and sedated.    Data reviewed today: I reviewed all medications, new labs and imaging results over the last 24 hours. I personally reviewed no images or EKG's today.    Physical Exam   Vital Signs: Temp: 100  F (37.8  C) Temp src: Bladder BP: 105/56 Pulse: 65   Resp: 23 SpO2: 100 % O2 Device: Mechanical Ventilator    Weight: 131 lbs 2.78 oz    Gen - intubated and sedated.  Lungs - CTA B anteriorly.  Heart - RR,S1+S2 nml, no m/g/r.  Abd - soft, NT, ND, + BS.  Ext - no edema.    Data   Recent Labs   Lab 02/24/21  0536 02/23/21 2009 02/23/21  1538   WBC  --  13.6* 7.8   HGB  --  13.1 14.3   MCV  --  82 84   PLT  --  202 327   NA  --  140 138   POTASSIUM 3.9 4.2 3.5   CHLORIDE  --  110* 105   CO2  --  20 21   BUN  --  11 13   CR  --  0.70 0.82   ANIONGAP  --  10 12   ABBY  --  8.1* 8.9   GLC  --  74 98   ALBUMIN  --   --  4.1   PROTTOTAL  --   --  8.1   BILITOTAL  --   --  0.5   ALKPHOS  --   --  46   ALT  --   --  26   AST  --   --  23     Recent Results (from the past 24 hour(s))    XR Chest Port 1 View    Narrative    CHEST PORTABLE ONE VIEW   2/23/2021 4:11 PM     HISTORY: Evaluate nasogastric tube and endotracheal tube placement.    COMPARISON: None.      Impression    IMPRESSION: Portable chest. Lungs are clear. Nasogastric tube extends  below left hemidiaphragm presumably in the stomach. Endotracheal tube  terminates approximately 3 cm above the douglas in good position.    DALILA CROOKS MD   Head CT w/o contrast    Narrative    EXAM: CT HEAD W/O CONTRAST  LOCATION: [facility]  DATE/TIME: 2/23/2021 4:43 PM    INDICATION: Mental status change, unknown cause. Unresponsive.  COMPARISON: None.  TECHNIQUE: Routine CT Head without IV contrast. Multiplanar reformats. Dose reduction techniques were used.    FINDINGS:  INTRACRANIAL CONTENTS: No intracranial hemorrhage, extraaxial collection, or mass effect.  No CT evidence of acute infarct. Normal parenchymal attenuation. Normal ventricles and sulci.     VISUALIZED ORBITS/SINUSES/MASTOIDS: No intraorbital abnormality. No significant sinus mucosal disease. Partial visualization of nasoenteric tube. Scattered secretions within the nasal cavity and nasopharynx. No middle ear or mastoid effusion.    BONES/SOFT TISSUES: No acute abnormality.      Impression    IMPRESSION:  1.  No acute intracranial process.     Medications     dexmedetomidine 0.7 mcg/kg/hr (02/24/21 0616)     midazolam 5 mg/hr (02/24/21 0600)     sodium chloride 100 mL/hr at 02/24/21 0507       chlorhexidine  15 mL Mouth/Throat Q12H     cloNIDine  0.1 mg Oral Q8H     folic acid  1 mg Oral Daily     folic acid  1 mg Oral Daily     [START ON 3/3/2021] gabapentin  100 mg Oral Q8H     [START ON 3/1/2021] gabapentin  300 mg Oral Q8H     [START ON 2/27/2021] gabapentin  600 mg Oral Q8H     gabapentin  900 mg Oral Q8H     gabapentin  1,200 mg Oral Once     multivitamin w/minerals  1 tablet Oral Daily     multivitamins w/minerals  15 mL Oral or Feeding Tube Daily     pantoprazole  (PROTONIX) IV  40 mg Intravenous Daily with breakfast     thiamine  100 mg Intravenous Daily     thiamine  200 mg Oral TID    Followed by     [START ON 2/26/2021] thiamine  100 mg Oral TID    Followed by     [START ON 3/3/2021] thiamine  100 mg Oral Daily

## 2021-02-24 NOTE — H&P
"[facility]  History and Physical  Hospitalist       Date of Admission:  2/23/2021    Chief Complaint   Altered Mental Status    History is obtained from the patient and emergency department physician.    History of Present Illness   Monica Matos is a 25 year old female with past medical history of dissociative identity disorder who was brought to the emergency department by EMS after being found with altered mental status.  Much of the history is obtained from the emergency department physician secondary to the patient's intubation and sedation.  The patient notably identifies as a male and goes by \"Britsilva\".  The patient also has a known history of dissociative identity disorder with a alternative persona named Pancho.  Per the patient's girlfriend, the patient was identified as Pancho today and was drinking alcohol in the park.  He called his girlfriend to pick him up and when she arrived she found him very intoxicated.  She took him home and he proceeded to become significantly altered and vomited blood so his girlfriend called EMS.  The patient continued to vomit in route and upon arrival had a GCS of 4 so the patient was intubated.  The patient is originally from [place] and was scheduled to fly back to [place] tomorrow.    In the emergency department the patient did have a alcohol level of 0.22 upon arrival.  Urine drug screen was drawn and was negative.  The patient also had a CT head which was unremarkable.  The patient also had a hemoglobin of 14.3 and nasogastric tube contents with scant blood.  The patient was intubated in the emergency department and admitted to the ICU.    ASSESSMENT/PLAN    1.  Acute Ventilator Dependent Respiratory Failure  - Secondary to #2  - Vent mgmt per Intensivist  - Admit to ICU    2.  Acute Toxic Encephalopathy  - Secondary to #3  - Neuro checks    3.  Acute Alcohol Intoxication  - Etoh level 0.22 on arrival  - Continue propofol and precedex, " wean sedation as able, likely able to extubate in AM  - CIWA  - MV, FA, Thiamine  - IVF  - Consult Social Work for chemical dependency    4.  Hematemesis  - Likely alcoholic gastritis   - Scant NG tube output in ED  - PPI IV  - Recheck Hgb in AM    5.  Dissociative Identity Disorder  - Would benefit from psych consult once extubated    PLAN: Resume home medications as appropriate once confirmed by pharmacy.  33 minutes of critical care time was spent on this patient with coordination of care.     DVT Prophylaxis: Pneumatic Compression Devices  Code Status: Full Code  Discharge Plan: Expected discharge: 2 - 3 days, recommended to prior living arrangement once mental status at baseline.      Tl Mehta,     Primary Care Physician   No primary care provider on file.    -----------------------------------------------------------------------------------------------------------------------------------------------------------------------------------------------------    Past Medical History    Dissociative Identity Disorder    Past Surgical History   Unable to obtain secondary to encephalopathy    Prior to Admission Medications   None     Allergies   Not on File    Social History   Unable to obtain secondary to encephalopathy    Family History   Unable to obtain secondary to encephalopathy    -----------------------------------------------------------------------------------------------------------------------------------------------------------------------------------------------------    Review of Systems   The 10 point Review of Systems is negative other than noted in the HPI or here.     Physical Exam   Temp: 97.2  F (36.2  C) Temp src: Temporal BP: 121/76 Pulse: 79   Resp: 20 SpO2: 100 % O2 Device: Mechanical Ventilator    Vital Signs with Ranges  Temp:  [96.6  F (35.9  C)-97.7  F (36.5  C)] 97.2  F (36.2  C)  Pulse:  [73-99] 79  Resp:  [11-40] 20  BP: (114-168)/() 121/76  FiO2 (%):  [30 %-40 %] 30  %  SpO2:  [100 %] 100 %  132 lbs 4.42 oz    Constitutional: Intubated, Sedated  Eyes: Conjunctiva and pupils examined and normal.  HEENT: Moist mucous membranes, normal dentition.  Respiratory: Clear to auscultation bilaterally, no crackles or wheezing.  Cardiovascular: Regular rate and rhythm, normal S1 and S2, and no murmur noted.  GI: Soft, non-distended, normal bowel sounds.  Lymph/Hematologic: No anterior cervical or supraclavicular adenopathy.  Skin: No rashes, no cyanosis, no edema.  Musculoskeletal: No joint swelling, erythema.  Neurologic: Withdraws to pain.  Psychiatric: no obvious anxiety or depression.     Data     Recent Labs   Lab 02/23/21  1538   WBC 7.8   HGB 14.3   MCV 84         POTASSIUM 3.5   CHLORIDE 105   CO2 21   BUN 13   CR 0.82   ANIONGAP 12   ABBY 8.9   GLC 98   ALBUMIN 4.1   PROTTOTAL 8.1   BILITOTAL 0.5   ALKPHOS 46   ALT 26   AST 23       Recent Results (from the past 24 hour(s))   XR Chest Port 1 View    Narrative    CHEST PORTABLE ONE VIEW   2/23/2021 4:11 PM     HISTORY: Evaluate nasogastric tube and endotracheal tube placement.    COMPARISON: None.      Impression    IMPRESSION: Portable chest. Lungs are clear. Nasogastric tube extends  below left hemidiaphragm presumably in the stomach. Endotracheal tube  terminates approximately 3 cm above the douglas in good position.    DALILA CROOKS MD   Head CT w/o contrast    Narrative    EXAM: CT HEAD W/O CONTRAST  LOCATION: [facility]  DATE/TIME: 2/23/2021 4:43 PM    INDICATION: Mental status change, unknown cause. Unresponsive.  COMPARISON: None.  TECHNIQUE: Routine CT Head without IV contrast. Multiplanar reformats. Dose reduction techniques were used.    FINDINGS:  INTRACRANIAL CONTENTS: No intracranial hemorrhage, extraaxial collection, or mass effect.  No CT evidence of acute infarct. Normal parenchymal attenuation. Normal ventricles and sulci.     VISUALIZED ORBITS/SINUSES/MASTOIDS: No intraorbital abnormality.  No significant sinus mucosal disease. Partial visualization of nasoenteric tube. Scattered secretions within the nasal cavity and nasopharynx. No middle ear or mastoid effusion.    BONES/SOFT TISSUES: No acute abnormality.      Impression    IMPRESSION:  1.  No acute intracranial process.

## 2021-02-25 VITALS
TEMPERATURE: 98.9 F | OXYGEN SATURATION: 98 % | BODY MASS INDEX: 22.39 KG/M2 | WEIGHT: 131.17 LBS | DIASTOLIC BLOOD PRESSURE: 87 MMHG | HEIGHT: 64 IN | SYSTOLIC BLOOD PRESSURE: 120 MMHG | HEART RATE: 72 BPM | RESPIRATION RATE: 18 BRPM

## 2021-02-25 LAB
ANION GAP SERPL CALCULATED.3IONS-SCNC: 5 MMOL/L (ref 3–14)
BASOPHILS # BLD AUTO: 0 10E9/L (ref 0–0.2)
BASOPHILS NFR BLD AUTO: 0.3 %
BUN SERPL-MCNC: 12 MG/DL (ref 7–30)
CALCIUM SERPL-MCNC: 8 MG/DL (ref 8.5–10.1)
CHLORIDE SERPL-SCNC: 107 MMOL/L (ref 94–109)
CO2 SERPL-SCNC: 26 MMOL/L (ref 20–32)
CREAT SERPL-MCNC: 0.76 MG/DL (ref 0.52–1.04)
DIFFERENTIAL METHOD BLD: ABNORMAL
EOSINOPHIL # BLD AUTO: 0.7 10E9/L (ref 0–0.7)
EOSINOPHIL NFR BLD AUTO: 6.9 %
ERYTHROCYTE [DISTWIDTH] IN BLOOD BY AUTOMATED COUNT: 13.3 % (ref 10–15)
GFR SERPL CREATININE-BSD FRML MDRD: >90 ML/MIN/{1.73_M2}
GLUCOSE SERPL-MCNC: 95 MG/DL (ref 70–99)
HCT VFR BLD AUTO: 39.3 % (ref 35–47)
HGB BLD-MCNC: 12.1 G/DL (ref 11.7–15.7)
IMM GRANULOCYTES # BLD: 0 10E9/L (ref 0–0.4)
IMM GRANULOCYTES NFR BLD: 0.3 %
LYMPHOCYTES # BLD AUTO: 2 10E9/L (ref 0.8–5.3)
LYMPHOCYTES NFR BLD AUTO: 19.9 %
MAGNESIUM SERPL-MCNC: 1.9 MG/DL (ref 1.6–2.3)
MCH RBC QN AUTO: 25.6 PG (ref 26.5–33)
MCHC RBC AUTO-ENTMCNC: 30.8 G/DL (ref 31.5–36.5)
MCV RBC AUTO: 83 FL (ref 78–100)
MONOCYTES # BLD AUTO: 0.9 10E9/L (ref 0–1.3)
MONOCYTES NFR BLD AUTO: 9.1 %
NEUTROPHILS # BLD AUTO: 6.4 10E9/L (ref 1.6–8.3)
NEUTROPHILS NFR BLD AUTO: 63.5 %
NRBC # BLD AUTO: 0 10*3/UL
NRBC BLD AUTO-RTO: 0 /100
PLATELET # BLD AUTO: 271 10E9/L (ref 150–450)
POTASSIUM SERPL-SCNC: 3.4 MMOL/L (ref 3.4–5.3)
RBC # BLD AUTO: 4.72 10E12/L (ref 3.8–5.2)
SODIUM SERPL-SCNC: 138 MMOL/L (ref 133–144)
WBC # BLD AUTO: 10 10E9/L (ref 4–11)

## 2021-02-25 PROCEDURE — C9113 INJ PANTOPRAZOLE SODIUM, VIA: HCPCS | Performed by: INTERNAL MEDICINE

## 2021-02-25 PROCEDURE — 250N000011 HC RX IP 250 OP 636: Performed by: INTERNAL MEDICINE

## 2021-02-25 PROCEDURE — 99238 HOSP IP/OBS DSCHRG MGMT 30/<: CPT | Performed by: INTERNAL MEDICINE

## 2021-02-25 PROCEDURE — 85025 COMPLETE CBC W/AUTO DIFF WBC: CPT | Performed by: INTERNAL MEDICINE

## 2021-02-25 PROCEDURE — 80048 BASIC METABOLIC PNL TOTAL CA: CPT | Performed by: INTERNAL MEDICINE

## 2021-02-25 PROCEDURE — 250N000013 HC RX MED GY IP 250 OP 250 PS 637: Performed by: INTERNAL MEDICINE

## 2021-02-25 PROCEDURE — 83735 ASSAY OF MAGNESIUM: CPT | Performed by: INTERNAL MEDICINE

## 2021-02-25 PROCEDURE — 36415 COLL VENOUS BLD VENIPUNCTURE: CPT | Performed by: INTERNAL MEDICINE

## 2021-02-25 RX ORDER — GUAIFENESIN/DEXTROMETHORPHAN 100-10MG/5
5 SYRUP ORAL EVERY 4 HOURS PRN
Status: DISCONTINUED | OUTPATIENT
Start: 2021-02-25 | End: 2021-02-25 | Stop reason: HOSPADM

## 2021-02-25 RX ORDER — POTASSIUM CHLORIDE 1500 MG/1
20 TABLET, EXTENDED RELEASE ORAL ONCE
Status: COMPLETED | OUTPATIENT
Start: 2021-02-25 | End: 2021-02-25

## 2021-02-25 RX ORDER — CODEINE PHOSPHATE AND GUAIFENESIN 10; 100 MG/5ML; MG/5ML
5 SOLUTION ORAL EVERY 4 HOURS PRN
Status: CANCELLED | OUTPATIENT
Start: 2021-02-25

## 2021-02-25 RX ADMIN — FOLIC ACID 1 MG: 1 TABLET ORAL at 08:01

## 2021-02-25 RX ADMIN — GUAIFENESIN AND DEXTROMETHORPHAN 5 ML: 100; 10 SYRUP ORAL at 02:05

## 2021-02-25 RX ADMIN — POTASSIUM CHLORIDE 20 MEQ: 1500 TABLET, EXTENDED RELEASE ORAL at 08:00

## 2021-02-25 RX ADMIN — PANTOPRAZOLE SODIUM 40 MG: 40 INJECTION, POWDER, FOR SOLUTION INTRAVENOUS at 08:00

## 2021-02-25 RX ADMIN — MULTIPLE VITAMINS W/ MINERALS TAB 1 TABLET: TAB at 08:01

## 2021-02-25 RX ADMIN — GUAIFENESIN AND DEXTROMETHORPHAN 5 ML: 100; 10 SYRUP ORAL at 07:53

## 2021-02-25 RX ADMIN — THIAMINE HCL TAB 100 MG 200 MG: 100 TAB at 08:00

## 2021-02-25 ASSESSMENT — ACTIVITIES OF DAILY LIVING (ADL)
ADLS_ACUITY_SCORE: 14

## 2021-02-25 NOTE — PROGRESS NOTES
Pt to D/C to home.  Pt provided with d/c instructions, when medications were last given, and when to take them again.  Pt also informed to f/u with PCP in 7 days.  Pt verbalized understanding of all d/c and f/u instructions.  All questions were answered at this time. Copy of paperwork sent with pt.   Sandrita (significant other) to provide transport.  All personal belongings sent with pt (clothing, shoes, cell phone, headphones, silver colored ring, glasses).

## 2021-02-25 NOTE — DISCHARGE SUMMARY
"[facility]  Hospitalist Discharge Summary      Date of Admission:  2/23/2021  Date of Discharge:  2/25/2021  Discharging Provider: Jeremy Delong MD      Discharge Diagnoses         Follow-ups Needed After Discharge   Follow-up Appointments     Follow-up and recommended labs and tests       Follow up with primary care provider, No primary care provider on file.,   within 7 days for hospital follow- up.  No follow up labs or test are   needed.             Unresulted Labs Ordered in the Past 30 Days of this Admission     Date and Time Order Name Status Description    2/24/2021 0752 Blood culture Preliminary     2/24/2021 0752 Blood culture Preliminary       These results will be followed up by PCP.    Discharge Disposition   Discharged to home  Condition at discharge: Stable      Hospital Course          Monica Matos is a 25 year old female with past medical history of dissociative identity disorder who was brought to the emergency department by EMS after being found with altered mental status.  Much of the history is obtained from the emergency department physician secondary to the patient's intubation and sedation.  The patient notably identifies as a male and goes by \"Carleensilva\".  The patient also has a known history of dissociative identity disorder with a alternative persona named Pancho.  Per the patient's girlfriend, the patient was identified as Pancho today and was [place] alcohol in the park.  He called his girlfriend to pick him up and when she arrived she found him very intoxicated.  She took him home and he proceeded to become significantly altered and vomited blood so his girlfriend called EMS.  The patient continued to vomit in route and upon arrival had a GCS of 4 so the patient was intubated.  The patient is originally from [place] and was scheduled to fly back to [place] tomorrow.     In the emergency department the patient did have a alcohol level of 0.22 upon " arrival.  Urine drug screen was drawn and was negative.  The patient also had a CT head which was unremarkable.  The patient also had a hemoglobin of 14.3 and nasogastric tube contents with scant blood.  The patient was intubated in the emergency department and admitted to the ICU.    1.  Acute Ventilator Dependent Respiratory Failure  - Secondary to #2  - Vent mgmt per Intensivist  - s/p extubation 2/24.  - intubated for airway protection.     2.  Acute Toxic Encephalopathy  - Secondary to #3  - resolved.     3.  Acute Alcohol Intoxication  - Etoh level 0.22 on arrival  - has binge drinking d/o.  - will f/u with PCP.        4.  Dissociative Identity Disorder  - Would benefit from psych eval as out-pt.    6. Fever.  - Will pan-culture.  - Likely reactive.  - WCC nml.    Admitted as inpatient. Extubated 2/24. Tolerating PO w/o any hypoxia and hemodynamically stable.          Consultations This Hospital Stay   INTENSIVIST IP CONSULT  SOCIAL WORK IP CONSULT    Code Status   Full Code    Time Spent on this Encounter   I, Jeremy Delong MD, personally saw the patient today and spent less than or equal to 30 minutes discharging this patient.       Jeremy Delong MD  [facility] ICU  [address] [place] [address]  Phone: [phone]  Fax: [phone]  ______________________________________________________________________    Physical Exam   Vital Signs: Temp: 98.9  F (37.2  C) Temp src: Oral BP: 118/79 Pulse: 61   Resp: 16 SpO2: 97 % O2 Device: None (Room air)    Weight: 131 lbs 2.78 oz    Gen - AAO x 3 in NAD.  Lungs - CTA B.  Heart - RR,S1+S2 nml, no m/g/r.  Abd - soft, NT, ND, + BS.  Ext - no edema.       Primary Care Physician   No primary care provider on file.    Discharge Orders      Follow-up and recommended labs and tests     Follow up with primary care provider, No primary care provider on file., within 7 days for hospital follow- up.  No follow up labs or test are needed.      Activity    Your activity upon discharge: activity as tolerated     Full Code     Diet    Follow this diet upon discharge: Orders Placed This Encounter      Advance Diet as Tolerated: Regular Diet Adult       Significant Results and Procedures   Results for orders placed or performed during the hospital encounter of 02/23/21   XR Chest Port 1 View    Narrative    CHEST PORTABLE ONE VIEW   2/23/2021 4:11 PM     HISTORY: Evaluate nasogastric tube and endotracheal tube placement.    COMPARISON: None.      Impression    IMPRESSION: Portable chest. Lungs are clear. Nasogastric tube extends  below left hemidiaphragm presumably in the stomach. Endotracheal tube  terminates approximately 3 cm above the douglas in good position.    DALILA CROOKS MD   Head CT w/o contrast    Narrative    EXAM: CT HEAD W/O CONTRAST  LOCATION: [facility]  DATE/TIME: 2/23/2021 4:43 PM    INDICATION: Mental status change, unknown cause. Unresponsive.  COMPARISON: None.  TECHNIQUE: Routine CT Head without IV contrast. Multiplanar reformats. Dose reduction techniques were used.    FINDINGS:  INTRACRANIAL CONTENTS: No intracranial hemorrhage, extraaxial collection, or mass effect.  No CT evidence of acute infarct. Normal parenchymal attenuation. Normal ventricles and sulci.     VISUALIZED ORBITS/SINUSES/MASTOIDS: No intraorbital abnormality. No significant sinus mucosal disease. Partial visualization of nasoenteric tube. Scattered secretions within the nasal cavity and nasopharynx. No middle ear or mastoid effusion.    BONES/SOFT TISSUES: No acute abnormality.      Impression    IMPRESSION:  1.  No acute intracranial process.       Discharge Medications   Current Discharge Medication List      CONTINUE these medications which have NOT CHANGED    Details   Omega-3 Fatty Acids (FISH OIL) 1200 MG capsule Take 1,200 mg by mouth daily      testosterone cypionate (DEPOTESTOSTERONE) 100 MG/ML injection Inject 25 mg into the muscle once a  week           Allergies   Not on File

## 2021-03-02 LAB
BACTERIA SPEC CULT: NO GROWTH
BACTERIA SPEC CULT: NO GROWTH
SPECIMEN SOURCE: NORMAL
SPECIMEN SOURCE: NORMAL

## 2022-05-02 ENCOUNTER — OFFICE VISIT (OUTPATIENT)
Dept: URGENT CARE | Facility: URGENT CARE | Age: 27
End: 2022-05-02
Payer: COMMERCIAL

## 2022-05-02 VITALS
RESPIRATION RATE: 16 BRPM | DIASTOLIC BLOOD PRESSURE: 80 MMHG | OXYGEN SATURATION: 100 % | HEART RATE: 104 BPM | TEMPERATURE: 98.9 F | SYSTOLIC BLOOD PRESSURE: 120 MMHG

## 2022-05-02 DIAGNOSIS — J18.9 PNEUMONIA DUE TO INFECTIOUS ORGANISM, UNSPECIFIED LATERALITY, UNSPECIFIED PART OF LUNG: Primary | ICD-10-CM

## 2022-05-02 DIAGNOSIS — R05.9 COUGH: ICD-10-CM

## 2022-05-02 PROCEDURE — 99204 OFFICE O/P NEW MOD 45 MIN: CPT | Performed by: PHYSICIAN ASSISTANT

## 2022-05-02 RX ORDER — DOXYCYCLINE 100 MG/1
100 CAPSULE ORAL 2 TIMES DAILY
Qty: 20 CAPSULE | Refills: 0 | Status: SHIPPED | OUTPATIENT
Start: 2022-05-02 | End: 2022-05-09

## 2022-05-02 RX ORDER — BENZONATATE 200 MG/1
200 CAPSULE ORAL 3 TIMES DAILY PRN
Qty: 21 CAPSULE | Refills: 0 | Status: SHIPPED | OUTPATIENT
Start: 2022-05-02 | End: 2022-05-09

## 2022-05-02 NOTE — LETTER
May 2, 2022      Monica Matos  [address] [facility] [address] [address]        To Whom It May Concern:    Monica Matos was seen in our clinic today. She was off work 5/1/2022 thru 5/4/2022 due to Pneumonia and may return to work 5/5/2022.      Sincerely,        Von Palafox [facility], PA-C

## 2022-05-02 NOTE — LETTER
May 2, 2022      Monica Matos  [address] [facility] [address]        To Whom It May Concern:    Monica Matos was seen in our clinic today. She was off work 5/1/2022 thru 5/4/2022 due to illness. She may return to work 5/5/2022.      Sincerely,        Von Palafox [facility], PA-C

## 2022-05-02 NOTE — PROGRESS NOTES
Assessment & Plan     Pneumonia due to infectious organism, unspecified laterality, unspecified part of lung  Pneumonia is an infection deep in the lungs. It is in the small air sacs (alveoli). It may be caused by a virus, fungus, or bacteria. Pneumonia caused by bacteria is often treated with an antibiotic. Severe cases may need to be treated in the hospital. Milder cases can be treated at home. Pneumonia symptoms are a lot like flu symptoms. They include fever, cough (dry or with phlegm), headache, muscle weakness, and pain. These symptoms often get worse in the first 2 days. But they often start to get better in the first week of treatment.    - XR Chest 2 Views; Future  - doxycycline monohydrate (MONODOX) 100 MG capsule; Take 1 capsule (100 mg) by mouth 2 times daily    Cough  Tessalon for coughing    - benzonatate (TESSALON) 200 MG capsule; Take 1 capsule (200 mg) by mouth 3 times daily as needed     At today's visit with Monica Matos , we discussed results, diagnosis, medications and formulated a plan.  We also discussed red flags for immediate return to clinic/ER, as well as indications for follow up if no improvement. Patient understood and agreed to plan. Monica Matos was discharged with stable vitals and has no further questions.       [place], [facility], PA-C  [facility] URGENT [facility]   Monica is a 26 year old who presents for the following health issues     HPI     Monica Matos, 26 year old, female presents to the urgent care today with:   URI (Pt has had a cough for 4 days but it has gotten worse and feels like it has went into chest )      Review of Systems   Constitutional, HEENT, cardiovascular, pulmonary, gi and gu systems are negative, except as otherwise noted.      Objective    /80   Pulse 104   Temp 98.9  F (37.2  C) (Tympanic)   Resp 16   SpO2 100%   There is no height or weight on file to calculate BMI.  Physical Exam    GENERAL: healthy, alert and no distress  EYES: Eyes grossly normal to inspection, PERRL and conjunctivae and sclerae normal  HENT: ear canals and TM's normal, nose and mouth without ulcers or lesions  NECK: no adenopathy, no asymmetry, masses, or scars and thyroid normal to palpation  RESP: rhonchi bilateral  CV: regular rate and rhythm, normal S1 S2, no S3 or S4, no murmur, click or rub, no peripheral edema and peripheral pulses strong  ABDOMEN: soft, nontender, no hepatosplenomegaly, no masses and bowel sounds normal  MS: no gross musculoskeletal defects noted, no edema  SKIN: no suspicious lesions or rashes  NEURO: Normal strength and tone, mentation intact and speech normal  PSYCH: mentation appears normal, affect normal/bright

## 2022-05-09 ENCOUNTER — APPOINTMENT (OUTPATIENT)
Dept: CT IMAGING | Facility: CLINIC | Age: 27
End: 2022-05-09
Payer: COMMERCIAL

## 2022-05-09 ENCOUNTER — HOSPITAL ENCOUNTER (EMERGENCY)
Facility: CLINIC | Age: 27
Discharge: HOME | End: 2022-05-09
Attending: EMERGENCY MEDICINE | Admitting: EMERGENCY MEDICINE
Payer: COMMERCIAL

## 2022-05-09 VITALS
TEMPERATURE: 98.2 F | DIASTOLIC BLOOD PRESSURE: 67 MMHG | RESPIRATION RATE: 20 BRPM | HEART RATE: 65 BPM | SYSTOLIC BLOOD PRESSURE: 104 MMHG | OXYGEN SATURATION: 100 %

## 2022-05-09 DIAGNOSIS — R05.9 COUGH: ICD-10-CM

## 2022-05-09 DIAGNOSIS — R06.00 DYSPNEA, UNSPECIFIED TYPE: ICD-10-CM

## 2022-05-09 DIAGNOSIS — R31.9 HEMATURIA, UNSPECIFIED TYPE: Primary | ICD-10-CM

## 2022-05-09 DIAGNOSIS — R07.9 CHEST PAIN, UNSPECIFIED TYPE: ICD-10-CM

## 2022-05-09 DIAGNOSIS — R00.1 SINUS BRADYCARDIA: ICD-10-CM

## 2022-05-09 LAB
ALBUMIN UR-MCNC: NEGATIVE MG/DL
ANION GAP SERPL CALCULATED.3IONS-SCNC: 2 MMOL/L (ref 3–14)
APPEARANCE UR: CLEAR
BASOPHILS # BLD AUTO: 0 10E3/UL (ref 0–0.2)
BASOPHILS NFR BLD AUTO: 0 %
BILIRUB UR QL STRIP: NEGATIVE
BUN SERPL-MCNC: 11 MG/DL (ref 7–30)
CALCIUM SERPL-MCNC: 9.1 MG/DL (ref 8.5–10.1)
CHLORIDE BLD-SCNC: 105 MMOL/L (ref 94–109)
CO2 SERPL-SCNC: 31 MMOL/L (ref 20–32)
COLOR UR AUTO: NORMAL
CREAT SERPL-MCNC: 0.78 MG/DL (ref 0.52–1.04)
CRP SERPL-MCNC: <2.9 MG/L (ref 0–8)
D DIMER PPP FEU-MCNC: <0.27 UG/ML FEU (ref 0–0.5)
EOSINOPHIL # BLD AUTO: 0.1 10E3/UL (ref 0–0.7)
EOSINOPHIL NFR BLD AUTO: 1 %
ERYTHROCYTE [DISTWIDTH] IN BLOOD BY AUTOMATED COUNT: 12.8 % (ref 10–15)
FLUAV RNA SPEC QL NAA+PROBE: NEGATIVE
FLUBV RNA RESP QL NAA+PROBE: NEGATIVE
GFR SERPL CREATININE-BSD FRML MDRD: >90 ML/MIN/1.73M2
GLUCOSE BLD-MCNC: 70 MG/DL (ref 70–99)
GLUCOSE UR STRIP-MCNC: NEGATIVE MG/DL
HCT VFR BLD AUTO: 49.4 % (ref 35–47)
HGB BLD-MCNC: 15.2 G/DL (ref 11.7–15.7)
HGB UR QL STRIP: NEGATIVE
IMM GRANULOCYTES # BLD: 0 10E3/UL
IMM GRANULOCYTES NFR BLD: 0 %
KETONES UR STRIP-MCNC: NEGATIVE MG/DL
LEUKOCYTE ESTERASE UR QL STRIP: NEGATIVE
LYMPHOCYTES # BLD AUTO: 1.5 10E3/UL (ref 0.8–5.3)
LYMPHOCYTES NFR BLD AUTO: 24 %
MCH RBC QN AUTO: 27 PG (ref 26.5–33)
MCHC RBC AUTO-ENTMCNC: 30.8 G/DL (ref 31.5–36.5)
MCV RBC AUTO: 88 FL (ref 78–100)
MONOCYTES # BLD AUTO: 0.8 10E3/UL (ref 0–1.3)
MONOCYTES NFR BLD AUTO: 12 %
NEUTROPHILS # BLD AUTO: 4 10E3/UL (ref 1.6–8.3)
NEUTROPHILS NFR BLD AUTO: 63 %
NITRATE UR QL: NEGATIVE
NRBC # BLD AUTO: 0 10E3/UL
NRBC BLD AUTO-RTO: 0 /100
NT-PROBNP SERPL-MCNC: 25 PG/ML (ref 0–450)
PH UR STRIP: 6.5 [PH] (ref 5–7)
PLATELET # BLD AUTO: 300 10E3/UL (ref 150–450)
POTASSIUM BLD-SCNC: 4.1 MMOL/L (ref 3.4–5.3)
RBC # BLD AUTO: 5.62 10E6/UL (ref 3.8–5.2)
RBC URINE: <1 /HPF
RSV RNA SPEC NAA+PROBE: NEGATIVE
SARS-COV-2 RNA RESP QL NAA+PROBE: NEGATIVE
SODIUM SERPL-SCNC: 138 MMOL/L (ref 133–144)
SP GR UR STRIP: 1 (ref 1–1.03)
SQUAMOUS EPITHELIAL: <1 /HPF
TROPONIN I SERPL HS-MCNC: 4 NG/L
UROBILINOGEN UR STRIP-MCNC: NORMAL MG/DL
WBC # BLD AUTO: 6.4 10E3/UL (ref 4–11)
WBC URINE: <1 /HPF

## 2022-05-09 PROCEDURE — 71250 CT THORAX DX C-: CPT

## 2022-05-09 PROCEDURE — 87637 SARSCOV2&INF A&B&RSV AMP PRB: CPT | Performed by: EMERGENCY MEDICINE

## 2022-05-09 PROCEDURE — 81001 URINALYSIS AUTO W/SCOPE: CPT | Performed by: EMERGENCY MEDICINE

## 2022-05-09 PROCEDURE — 86140 C-REACTIVE PROTEIN: CPT | Performed by: EMERGENCY MEDICINE

## 2022-05-09 PROCEDURE — 36415 COLL VENOUS BLD VENIPUNCTURE: CPT | Performed by: EMERGENCY MEDICINE

## 2022-05-09 PROCEDURE — 80048 BASIC METABOLIC PNL TOTAL CA: CPT | Performed by: EMERGENCY MEDICINE

## 2022-05-09 PROCEDURE — 99285 EMERGENCY DEPT VISIT HI MDM: CPT | Mod: 25

## 2022-05-09 PROCEDURE — 85379 FIBRIN DEGRADATION QUANT: CPT | Performed by: EMERGENCY MEDICINE

## 2022-05-09 PROCEDURE — C9803 HOPD COVID-19 SPEC COLLECT: HCPCS

## 2022-05-09 PROCEDURE — 84484 ASSAY OF TROPONIN QUANT: CPT | Performed by: EMERGENCY MEDICINE

## 2022-05-09 PROCEDURE — 85004 AUTOMATED DIFF WBC COUNT: CPT | Performed by: EMERGENCY MEDICINE

## 2022-05-09 PROCEDURE — 93005 ELECTROCARDIOGRAM TRACING: CPT

## 2022-05-09 PROCEDURE — 83880 ASSAY OF NATRIURETIC PEPTIDE: CPT | Performed by: EMERGENCY MEDICINE

## 2022-05-09 RX ORDER — DEXTROMETHORPHAN HBR. AND GUAIFENESIN 10; 100 MG/5ML; MG/5ML
5 SOLUTION ORAL 4 TIMES DAILY PRN
Qty: 118 ML | Refills: 0 | Status: SHIPPED | OUTPATIENT
Start: 2022-05-09 | End: 2022-10-09

## 2022-05-09 RX ORDER — ALBUTEROL SULFATE 90 UG/1
2 AEROSOL, METERED RESPIRATORY (INHALATION)
Qty: 6.7 G | Refills: 0 | Status: SHIPPED | OUTPATIENT
Start: 2022-05-09 | End: 2023-09-24

## 2022-05-09 ASSESSMENT — ENCOUNTER SYMPTOMS
DYSURIA: 0
NAUSEA: 0
ABDOMINAL PAIN: 0
SHORTNESS OF BREATH: 1
COUGH: 1
FEVER: 0
FLANK PAIN: 0
BACK PAIN: 1
VOMITING: 0
HEMATURIA: 1

## 2022-05-09 NOTE — Clinical Note
Patient's care was signed out to Dr. Scott, who will determine Dispo based on patient's imaging results.

## 2022-05-09 NOTE — ED NOTES
Patient signout.    Patient signed out awaiting CT scan result.  CT shows no acute findings.  Likely does not need the doxycycline will discontinue this and focus on symptom control.      ICD-10-CM    1. Hematuria, unspecified type  R31.9 Primary Care Referral   2. Sinus bradycardia  R00.1 Primary Care Referral    HR 47 on ECG 5/9/22   3. Cough  R05.9 Primary Care Referral   4. Chest pain, unspecified type  R07.9 Primary Care Referral   5. Dyspnea, unspecified type  R06.00 Primary Care Referral         CT Chest Abdomen Pelvis w/o Contrast   Preliminary Result   IMPRESSION: No evidence of acute pathology in the chest, abdomen or   pelvis.           Oscar Scott MD  [facility]  [facility]       Oscar Scott MD  05/09/22 5631

## 2022-05-09 NOTE — ED TRIAGE NOTES
Pt was diagnosed with pneumonia on 5/2. Pt taking antibiotics and cough suppressant but is not feeling better. Pt complaining of cough, fatigue with exertion, and back pain.

## 2022-05-09 NOTE — ED PROVIDER NOTES
History     Chief Complaint:  Worsening Cough/Shortness of Breath  Hematuria      HPI   Stef Matos is a 26 year old transgender female identifying as male (he/him) who presents to the ED with multiple complaints, including worsening cough/shortness of breath and hematuria.  Regarding his cough, Monica says this started back on April 28.  He was seen in urgent care May 2 where he had a chest x-ray done.  Chest x-ray on chart review looks clear (per below), however the patient was diagnosed with pneumonia and put on a course of doxycycline and Tessalon Perles.  He comes in today complaining that he is not getting any better.  He feels like he is swimming and fluid when he lays flat.  He describes a stabbing pain on the right side of his chest with coughing.  He is concerned that he gets out of breath easily with activity.  He denies any personal history of a blood clot, family history, recent surgeries travel or immobilizations.  He has not been coughing up blood.  He mentions he tested negative for COVID at a drive up clinic May 1.  He also mentions everyone in his household was sick with a cold in late April and also tested negative for COVID.    Urgent Care Encounter:  XR CHEST 2 VW 5/2/2022                                                                  IMPRESSION: No infiltrate, pleural effusion or pneumothorax. The  cardiac and mediastinal silhouettes are normal.    Additionally, Stef states he has been passing clots from his urethra over the past 2 days.  He denies any burning.  He mentions some lower back pain that feels similar to past kidney infections he has had.  He denies any fever.  He mentions he has been on testosterone therapy for the past 18 months, as he is transitioning to male.  He has had no menstrual cycle over the past year and a half since starting the testosterone.  He has not undergone any surgeries to become male.    Review of Systems   Constitutional: Negative for fever.    HENT: Positive for congestion.    Respiratory: Positive for cough and shortness of breath.    Cardiovascular: Positive for chest pain. Negative for leg swelling.   Gastrointestinal: Negative for abdominal pain, nausea and vomiting.   Genitourinary: Positive for hematuria. Negative for dysuria and flank pain.   Musculoskeletal: Positive for back pain.   All other systems reviewed and are negative.    Allergies:  NKDA    Medications:    benzonatate (TESSALON) 200 MG capsule  doxycycline monohydrate (MONODOX) 100 MG capsule  Omega-3 Fatty Acids (FISH OIL) 1200 MG capsule  testosterone cypionate (DEPOTESTOSTERONE) 100 MG/ML injection      Past Medical History:    No past medical history on file.  Patient Active Problem List    Diagnosis Date Noted     Stupor 02/23/2021     Priority: Medium     Alcoholic intoxication with complication (H) 02/23/2021     Priority: Medium      Past Surgical History:    No past surgeries    Family History:    No family history of blood clot    Social History:  Presents with girlfriend  Lives at home with his family  Moved recently from [place]    Physical Exam     Patient Vitals for the past 24 hrs:   BP Temp Temp src Pulse Resp SpO2   05/09/22 1355 129/89 98.2  F (36.8  C) Temporal 101 20 98 %       Physical Exam  General: Pleasant adult female (identifies as male) sitting up in hospital room with girlfriend at bedside.  HENT: Patient wearing face mask. When taken off, mucous membranes appear moist.  Eyes: Conjunctive and sclera clear.  CV: Mildly tachycardic rate, regular rhythm. Normal S1, S2. No appreciable murmurs.  Resp: Lungs clear to auscultation bilaterally. Normal respiratory effort. Speaks in full sentences. Occasional observed during exam.  GI: Abdomen soft, non distended and nontender. No rebound or guarding.  MSK: Moves all extremities without difficulty. No lower extremity edema.  Skin: Warm and dry.  Neuro: Awake, alert, oriented. Cranial nerves grossly intact.  Psych:  Cooperative. Normal affect.      Emergency Department Course   ECG:  ECG taken at 1508, ECG read at 1516  Sinus bradycardia with short MA. Otherwise normal ECG.   Decreased rate compared to EKG 2/23/21.  Rate 47 bpm. MA interval 110 ms. QRS duration 88 ms. QT/QTc 392/346 ms. P-R-T axes 48 85 45.     Imaging:  CT Chest Abdomen Pelvis w/o Contrast    (Results Pending)     Report per radiology    Laboratory:  Labs Ordered and Resulted from Time of ED Arrival to Time of ED Departure   BASIC METABOLIC PANEL - Abnormal       Result Value    Sodium 138      Potassium 4.1      Chloride 105      Carbon Dioxide (CO2) 31      Anion Gap 2 (*)     Urea Nitrogen 11      Creatinine 0.78      Calcium 9.1      Glucose 70      GFR Estimate >90     CBC WITH PLATELETS AND DIFFERENTIAL - Abnormal    WBC Count 6.4      RBC Count 5.62 (*)     Hemoglobin 15.2      Hematocrit 49.4 (*)     MCV 88      MCH 27.0      MCHC 30.8 (*)     RDW 12.8      Platelet Count 300      % Neutrophils 63      % Lymphocytes 24      % Monocytes 12      % Eosinophils 1      % Basophils 0      % Immature Granulocytes 0      NRBCs per 100 WBC 0      Absolute Neutrophils 4.0      Absolute Lymphocytes 1.5      Absolute Monocytes 0.8      Absolute Eosinophils 0.1      Absolute Basophils 0.0      Absolute Immature Granulocytes 0.0      Absolute NRBCs 0.0     D DIMER QUANTITATIVE - Normal    D-Dimer Quantitative <0.27     TROPONIN I - Normal    Troponin I High Sensitivity 4     CRP INFLAMMATION - Normal    CRP Inflammation <2.9     ROUTINE UA WITH MICROSCOPIC REFLEX TO CULTURE - Normal    Color Urine Straw      Appearance Urine Clear      Glucose Urine Negative      Bilirubin Urine Negative      Ketones Urine Negative      Specific Gravity Urine 1.005      Blood Urine Negative      pH Urine 6.5      Protein Albumin Urine Negative      Urobilinogen Urine Normal      Nitrite Urine Negative      Leukocyte Esterase Urine Negative      RBC Urine <1      WBC Urine <1       Squamous Epithelials Urine <1     NT PROBNP INPATIENT - Normal    N terminal Pro BNP Inpatient 25     INFLUENZA A/B & SARS-COV2 PCR MULTIPLEX - Normal    Influenza A PCR Negative      Influenza B PCR Negative      RSV PCR Negative      SARS CoV2 PCR Negative           Emergency Department Course:    Reviewed:  I reviewed nursing notes, vitals and past medical history    Assessments:  1400 I obtained history and examined the patient as noted above.     1524 I rechecked the patient and explained findings thus far.     1551 I rechecked the patient, who was resting in the room. I signed out his care to Dr. Scott, who will follow up on his CT results and COVID/Flu swab.     Interventions:  Medications - No data to display    Disposition:  The patient's care was signed out to Dr. Scott at the end of my shift. I anticipate discharge home with outpatient urology follow up.    Impression & Plan      Medical Decision Making:  Stef is a 25yo female transgender identifying as male who presented to the ED for multiple complaints, including worsening cough/SOB and hematuria per HPI above. On arrival, his SpO2 was 98%, he was afebrile and mildly tachycardic. Regarding his worsening cough, shortness of breath and stabbing right chest pain, initial consideration was given for (but not limited to) pneumonia, pericarditis, pulmonary embolism, atypical ACS, COVID, heart failure. In the setting of his initial tachycardia, chest discomfort and testosterone use, I obtained d dimer. This was not elevated. Lungs sounded clear on auscultation and he was afebrile. In the setting of pleuritic chest discomfort and recent cough/cold, I was concerned about potential pericarditis. However, ECG showed no ME depressions or diffuse ST elevations, his trop was not elevated and CRP was not elevated. ECG and Troponin also showed no evidence of cardiac ischemia. BNP was not elevated, reassuring this does not represent heart failure. COVID/Flu swabs  and CT imaging was pending at the end of my shift and I signed his care out to Dr. Scott. See Dr. Scott's separate note.    Regarding his lower back pain and hematuria, I obtained an initial urine analysis. This was negative for any blood or signs of infection. It was sent for culture. BMP showed normal kidney function. I did obtain CT imaging, which was pending at the end of my shift. I signed out the patient's care to Dr. Scott, who will follow up on this. See his separate note.    Of note, the patient does not have established care with Primary Care since he recently moved from [place]. I provided a referral through the [facility] and he can expect a phone call for initial appointment to establish care.    Covid-19  Monica Matos was evaluated during a global COVID-19 pandemic, which necessitated consideration that the patient might be at risk for infection with the SARS-CoV-2 virus that causes COVID-19.   Applicable protocols for evaluation were followed during the patient's care.  COVID-19 was considered as part of the patient's evaluation.    Diagnosis:    ICD-10-CM    1. Hematuria, unspecified type  R31.9 Primary Care Referral   2. Sinus bradycardia  R00.1 Primary Care Referral    HR 47 on ECG 5/9/22   3. Cough  R05.9 Primary Care Referral   4. Chest pain, unspecified type  R07.9 Primary Care Referral   5. Dyspnea, unspecified type  R06.00 Primary Care Referral     Christina Land PA-C  EPPA APC-T  I saw and evaluated this patient under attending provider Dr. Jennings. He was signed out to Dr. Scott at the end of my shift. See separate provider notes.       Christina Land PA-C  05/09/22 1600

## 2022-05-10 LAB
ATRIAL RATE - MUSE: 47 BPM
DIASTOLIC BLOOD PRESSURE - MUSE: NORMAL MMHG
INTERPRETATION ECG - MUSE: NORMAL
P AXIS - MUSE: 48 DEGREES
PR INTERVAL - MUSE: 110 MS
QRS DURATION - MUSE: 88 MS
QT - MUSE: 392 MS
QTC - MUSE: 346 MS
R AXIS - MUSE: 85 DEGREES
SYSTOLIC BLOOD PRESSURE - MUSE: NORMAL MMHG
T AXIS - MUSE: 45 DEGREES
VENTRICULAR RATE- MUSE: 47 BPM

## 2022-05-10 NOTE — ED PROVIDER NOTES
ED ATTENDING PHYSICIAN NOTE:   I evaluated this patient in conjunction withPA-C  I have participated in the care of the patient and personally performed key elements of the history, exam, and medical decision making.      HPI:   Monica Matos is a 26 year old transgender born female transitioning to male individual who presents to the emergency department with multiple complaints including cough, shortness of breath and hematuria.  Patient was seen at outside clinic on May 2 where he was diagnosed with pneumonia and started on doxycycline.  Despite being compliant with antibiotics he is continued to have symptoms.  Over the same time.  He is also noted some blood in his urine and back pain which he relates to UTIs that he has had in the past.     EXAM:   General: Patient is alert, awake  Head: The scalp, face, and head appear normal  Eyes: Conjunctivae are normal  ENT: The nose is normal, Pinnae are normal, External acoustic canals are normal  Neck: Trachea midline  CV: Pulses are normal.   Resp: No respiratory distress   Musc: Normal muscular tone, moving all extremities.  Skin: No rash or lesions noted  Neuro: Face is symmetric.   Psych: Normal affect.  Appropriate interactions.       MEDICAL DECISION MAKING/ASSESSMENT AND PLAN:    Patient is a 26-year-old individual who presents to the emergency department with multiple complaints including cough and shortness of breath.  He was diagnosed with pneumonia a few days ago at urgent care and was started on antibiotics.  In review of the patient's chest x-ray there was no evidence of pneumonia.  Repeat CT scan will be completed today to rule out any evidence of atypical pneumonia.  However I am highly suspicious that this likely represents a viral illness and antibiotics would be of little benefit.  Also look for secondary causes of cough and shortness of breath including PE, coronary artery disease, pericarditis, congestive heart failure, anemia or significant  electrolyte abnormality.  Fortunately his work-up is very reassuring without any sinister causes for his symptoms noted today.  CT scan of the chest abdomen pelvis are currently pending to investigate ongoing shortness of breath and hematuria.  Dr. Scott will follow up on these results and patient's ultimate disposition will be based on these images.  However I believe it is highly likely that patient will be discharged home with close PCP follow-up.     DIAGNOSIS:     ICD-10-CM    1. Hematuria, unspecified type  R31.9 Primary Care Referral   2. Sinus bradycardia  R00.1 Primary Care Referral    HR 47 on ECG 5/9/22   3. Cough  R05.9 Primary Care Referral   4. Chest pain, unspecified type  R07.9 Primary Care Referral   5. Dyspnea, unspecified type  R06.00 Primary Care Referral            DISPOSITION:   Discharge home        5/9/2022  [facility] EMERGENCY DEPT      Steve Jennings MD  05/10/22 2440

## 2022-06-07 ENCOUNTER — HOSPITAL ENCOUNTER (EMERGENCY)
Facility: CLINIC | Age: 27
Discharge: HOME | End: 2022-06-07
Attending: EMERGENCY MEDICINE | Admitting: EMERGENCY MEDICINE
Payer: COMMERCIAL

## 2022-06-07 ENCOUNTER — APPOINTMENT (OUTPATIENT)
Dept: ULTRASOUND IMAGING | Facility: CLINIC | Age: 27
End: 2022-06-07
Attending: EMERGENCY MEDICINE
Payer: COMMERCIAL

## 2022-06-07 VITALS
OXYGEN SATURATION: 98 % | HEART RATE: 51 BPM | RESPIRATION RATE: 18 BRPM | DIASTOLIC BLOOD PRESSURE: 76 MMHG | SYSTOLIC BLOOD PRESSURE: 114 MMHG | TEMPERATURE: 98.5 F

## 2022-06-07 DIAGNOSIS — R10.84 ABDOMINAL PAIN, GENERALIZED: ICD-10-CM

## 2022-06-07 DIAGNOSIS — K42.9 UMBILICAL HERNIA WITHOUT OBSTRUCTION AND WITHOUT GANGRENE: ICD-10-CM

## 2022-06-07 LAB
ALBUMIN SERPL-MCNC: 4 G/DL (ref 3.4–5)
ALBUMIN UR-MCNC: NEGATIVE MG/DL
ALP SERPL-CCNC: 42 U/L (ref 40–150)
ALT SERPL W P-5'-P-CCNC: 24 U/L (ref 0–50)
ANION GAP SERPL CALCULATED.3IONS-SCNC: 5 MMOL/L (ref 3–14)
APPEARANCE UR: CLEAR
AST SERPL W P-5'-P-CCNC: 34 U/L (ref 0–45)
BASOPHILS # BLD AUTO: 0 10E3/UL (ref 0–0.2)
BASOPHILS NFR BLD AUTO: 0 %
BILIRUB SERPL-MCNC: 1 MG/DL (ref 0.2–1.3)
BILIRUB UR QL STRIP: NEGATIVE
BUN SERPL-MCNC: 11 MG/DL (ref 7–30)
CALCIUM SERPL-MCNC: 9.1 MG/DL (ref 8.5–10.1)
CHLORIDE BLD-SCNC: 103 MMOL/L (ref 94–109)
CLUE CELLS: ABNORMAL
CO2 SERPL-SCNC: 27 MMOL/L (ref 20–32)
COLOR UR AUTO: NORMAL
CREAT SERPL-MCNC: 0.74 MG/DL (ref 0.52–1.04)
EOSINOPHIL # BLD AUTO: 0.1 10E3/UL (ref 0–0.7)
EOSINOPHIL NFR BLD AUTO: 1 %
ERYTHROCYTE [DISTWIDTH] IN BLOOD BY AUTOMATED COUNT: 12.8 % (ref 10–15)
GFR SERPL CREATININE-BSD FRML MDRD: >90 ML/MIN/1.73M2
GLUCOSE BLD-MCNC: 99 MG/DL (ref 70–99)
GLUCOSE UR STRIP-MCNC: NEGATIVE MG/DL
HCG SERPL QL: NEGATIVE
HCT VFR BLD AUTO: 52.4 % (ref 35–47)
HGB BLD-MCNC: 16.7 G/DL (ref 11.7–15.7)
HGB UR QL STRIP: NEGATIVE
IMM GRANULOCYTES # BLD: 0 10E3/UL
IMM GRANULOCYTES NFR BLD: 0 %
KETONES UR STRIP-MCNC: NEGATIVE MG/DL
LEUKOCYTE ESTERASE UR QL STRIP: NEGATIVE
LIPASE SERPL-CCNC: 90 U/L (ref 73–393)
LYMPHOCYTES # BLD AUTO: 1.5 10E3/UL (ref 0.8–5.3)
LYMPHOCYTES NFR BLD AUTO: 25 %
MCH RBC QN AUTO: 27.1 PG (ref 26.5–33)
MCHC RBC AUTO-ENTMCNC: 31.9 G/DL (ref 31.5–36.5)
MCV RBC AUTO: 85 FL (ref 78–100)
MONOCYTES # BLD AUTO: 0.6 10E3/UL (ref 0–1.3)
MONOCYTES NFR BLD AUTO: 9 %
NEUTROPHILS # BLD AUTO: 4 10E3/UL (ref 1.6–8.3)
NEUTROPHILS NFR BLD AUTO: 65 %
NITRATE UR QL: NEGATIVE
NRBC # BLD AUTO: 0 10E3/UL
NRBC BLD AUTO-RTO: 0 /100
PH UR STRIP: 6.5 [PH] (ref 5–7)
PLATELET # BLD AUTO: 273 10E3/UL (ref 150–450)
POTASSIUM BLD-SCNC: 4.8 MMOL/L (ref 3.4–5.3)
PROT SERPL-MCNC: 8 G/DL (ref 6.8–8.8)
RBC # BLD AUTO: 6.16 10E6/UL (ref 3.8–5.2)
RBC URINE: 0 /HPF
SODIUM SERPL-SCNC: 135 MMOL/L (ref 133–144)
SP GR UR STRIP: 1 (ref 1–1.03)
TRICHOMONAS, WET PREP: ABNORMAL
UROBILINOGEN UR STRIP-MCNC: NORMAL MG/DL
WBC # BLD AUTO: 6.1 10E3/UL (ref 4–11)
WBC URINE: <1 /HPF
WBC'S/HIGH POWER FIELD, WET PREP: ABNORMAL
YEAST, WET PREP: ABNORMAL

## 2022-06-07 PROCEDURE — 82040 ASSAY OF SERUM ALBUMIN: CPT | Performed by: EMERGENCY MEDICINE

## 2022-06-07 PROCEDURE — 85025 COMPLETE CBC W/AUTO DIFF WBC: CPT | Performed by: EMERGENCY MEDICINE

## 2022-06-07 PROCEDURE — 250N000011 HC RX IP 250 OP 636: Performed by: EMERGENCY MEDICINE

## 2022-06-07 PROCEDURE — 76856 US EXAM PELVIC COMPLETE: CPT

## 2022-06-07 PROCEDURE — 87210 SMEAR WET MOUNT SALINE/INK: CPT | Performed by: EMERGENCY MEDICINE

## 2022-06-07 PROCEDURE — 84703 CHORIONIC GONADOTROPIN ASSAY: CPT | Performed by: EMERGENCY MEDICINE

## 2022-06-07 PROCEDURE — 87591 N.GONORRHOEAE DNA AMP PROB: CPT | Performed by: EMERGENCY MEDICINE

## 2022-06-07 PROCEDURE — 87491 CHLMYD TRACH DNA AMP PROBE: CPT | Performed by: EMERGENCY MEDICINE

## 2022-06-07 PROCEDURE — 80053 COMPREHEN METABOLIC PANEL: CPT | Performed by: EMERGENCY MEDICINE

## 2022-06-07 PROCEDURE — 83690 ASSAY OF LIPASE: CPT | Performed by: EMERGENCY MEDICINE

## 2022-06-07 PROCEDURE — 99285 EMERGENCY DEPT VISIT HI MDM: CPT | Mod: 25

## 2022-06-07 PROCEDURE — 36415 COLL VENOUS BLD VENIPUNCTURE: CPT | Performed by: EMERGENCY MEDICINE

## 2022-06-07 PROCEDURE — 81001 URINALYSIS AUTO W/SCOPE: CPT | Performed by: EMERGENCY MEDICINE

## 2022-06-07 PROCEDURE — 96374 THER/PROPH/DIAG INJ IV PUSH: CPT

## 2022-06-07 RX ORDER — POTASSIUM CHLORIDE 1.5 G/1.58G
20 POWDER, FOR SOLUTION ORAL ONCE
Status: DISCONTINUED | OUTPATIENT
Start: 2022-06-07 | End: 2022-06-07

## 2022-06-07 RX ORDER — MORPHINE SULFATE 4 MG/ML
4 INJECTION, SOLUTION INTRAMUSCULAR; INTRAVENOUS
Status: DISCONTINUED | OUTPATIENT
Start: 2022-06-07 | End: 2022-06-07 | Stop reason: HOSPADM

## 2022-06-07 RX ADMIN — MORPHINE SULFATE 4 MG: 4 INJECTION INTRAVENOUS at 12:28

## 2022-06-07 ASSESSMENT — ENCOUNTER SYMPTOMS
DIARRHEA: 1
NAUSEA: 1
ABDOMINAL PAIN: 1
FEVER: 0

## 2022-06-07 NOTE — DISCHARGE INSTRUCTIONS
Follow-up with [facility] to further evaluate your medication regiment.    Return to the ER for fever, worsening pain, vomiting, or any new concerns.

## 2022-06-07 NOTE — ED PROVIDER NOTES
"  History   Chief Complaint:  Abdominal Pain       The history is provided by the patient.      Monica, \"Stef\" IDANIA Matos is a 26 year old transgender, female transitioning to male individual, who presents with abdominal pain. The patient has a history of an umbilical hernia that he was advised to have repaired 6 months ago; however, he did not have this done due to conflicts with work. Over the past three weeks, he has been experiencing more significant pain around the hernia which is exacerbated with movement and eating. After eating, he often notes significant nausea that does not quickly resolve. He notes that at least once a week it begins to bulge out but he is able to easily push it back in. Additionally over the past three weeks, he has noticed intermittent pelvic pain and discomfort as well as abnormal vaginal discharge. He has Depo testosterone prescribed through [facility].  This is self-administered once per week, usually on Fridays.  He denies any recent travel, dietary changes, or new exercises. He has not had any recent fevers.  He has not had a menstrual period in 1 year and 7 months.       Review of Systems   Constitutional: Negative for fever.   Gastrointestinal: Positive for abdominal pain, diarrhea and nausea.   Genitourinary: Positive for vaginal discharge and vaginal pain.   All other systems reviewed and are negative.        Allergies:  The patient has no known allergies.     Medications:  Albuterol inhaler   Testosterone cypionate injection   Zofran   Senna-docusate  Promethazine    Past Medical History:     Major depressive disorder  Umbilical hernia  Anxiety   Headaches  Transition from female to male    Past Surgical History:    The patient has no pertinent surgical history.     Family History:    The patient has no known family history.    Social History:  The patient presents to the ED with a significant other.      Physical Exam     Patient Vitals for the past 24 hrs:   BP " Temp Temp src Pulse Resp SpO2   06/07/22 1217 126/84 -- -- 61 -- --   06/07/22 1158 130/87 98.5  F (36.9  C) Temporal 71 18 99 %       Physical Exam  Exam conducted with a chaperone present (Scant whitish discharge. No cervical motion tenderness.).   Constitutional:       General: She is not in acute distress.     Appearance: She is not diaphoretic.   HENT:      Head: Atraumatic.      Mouth/Throat:      Pharynx: No oropharyngeal exudate.   Eyes:      General: No scleral icterus.     Pupils: Pupils are equal, round, and reactive to light.   Cardiovascular:      Heart sounds: Normal heart sounds.   Pulmonary:      Effort: No respiratory distress.      Breath sounds: Normal breath sounds.   Abdominal:      General: Bowel sounds are normal.      Palpations: Abdomen is soft.      Tenderness: There is abdominal tenderness in the suprapubic area.      Comments: Palpable umbilical defect without any incarcerated contents   Musculoskeletal:         General: No tenderness.   Skin:     General: Skin is warm.      Findings: No rash.           Emergency Department Course     Imaging:  US Pelvic Complete w Transvaginal   Preliminary Result   IMPRESSION: No acute process demonstrated.        Report per radiology    Laboratory:  Labs Ordered and Resulted from Time of ED Arrival to Time of ED Departure   CBC WITH PLATELETS AND DIFFERENTIAL - Abnormal       Result Value    WBC Count 6.1      RBC Count 6.16 (*)     Hemoglobin 16.7 (*)     Hematocrit 52.4 (*)     MCV 85      MCH 27.1      MCHC 31.9      RDW 12.8      Platelet Count 273      % Neutrophils 65      % Lymphocytes 25      % Monocytes 9      % Eosinophils 1      % Basophils 0      % Immature Granulocytes 0      NRBCs per 100 WBC 0      Absolute Neutrophils 4.0      Absolute Lymphocytes 1.5      Absolute Monocytes 0.6      Absolute Eosinophils 0.1      Absolute Basophils 0.0      Absolute Immature Granulocytes 0.0      Absolute NRBCs 0.0     WET PREPARATION - Abnormal     Trichomonas Absent      Yeast Absent      Clue Cells Absent      WBCs/high power field 3+ (*)    COMPREHENSIVE METABOLIC PANEL - Normal    Sodium 135      Potassium 4.8      Chloride 103      Carbon Dioxide (CO2) 27      Anion Gap 5      Urea Nitrogen 11      Creatinine 0.74      Calcium 9.1      Glucose 99      Alkaline Phosphatase 42      AST 34      ALT 24      Protein Total 8.0      Albumin 4.0      Bilirubin Total 1.0      GFR Estimate >90     LIPASE - Normal    Lipase 90     ROUTINE UA WITH MICROSCOPIC REFLEX TO CULTURE - Normal    Color Urine Straw      Appearance Urine Clear      Glucose Urine Negative      Bilirubin Urine Negative      Ketones Urine Negative      Specific Gravity Urine 1.004      Blood Urine Negative      pH Urine 6.5      Protein Albumin Urine Negative      Urobilinogen Urine Normal      Nitrite Urine Negative      Leukocyte Esterase Urine Negative      RBC Urine 0      WBC Urine <1     HCG QUALITATIVE PREGNANCY - Normal    hCG Serum Qualitative Negative     CHLAMYDIA TRACHOMATIS PCR   NEISSERIA GONORRHOEAE PCR        Emergency Department Course:             Reviewed:  I reviewed nursing notes, vitals and past medical history    Assessments:  1205 I obtained history and examined the patient as noted above.   1240 Patient rechecked and pelvic exam was performed alongside nurse, Sinduh.  1333 I rechecked the patient and explained findings.     Interventions:  1228 Morphine 4 mg IV    Disposition:  The patient was discharged to home.     Impression & Plan         Medical Decision Making:  This patient is a 26-year-old female to male transgender individual who presents with abdominal pain.  There is a palpable umbilical hernia defect without any incarcerated contents.  Recent CT scan did not show any abnormality.  The patient has been advised to follow-up with surgery in the past but was not able to.  Referral information was provided today.    The patient is also complaining of suprapubic  discomfort which has been a problem since puberty.  This is slightly increased recently.  [facility] ultrasound looks normal.  Cultures are pending.  Urine looks good.  External exam does not demonstrate any abnormality.  It is possible that the intermittent cramping could be worse related to the Depo testosterone and this will be followed up through [facility] where he was prescribed.      Diagnosis:    ICD-10-CM    1. Abdominal pain, generalized  R10.84    2. Umbilical hernia without obstruction and without gangrene  K42.9        Discharge Medications:  New Prescriptions    No medications on file       Scribe Disclosure:  I, Marsha Bates, am serving as a scribe at 12:01 PM on 6/7/2022 to document services personally performed by Torin Castro MD based on my observations and the provider's statements to me.              Torin Castro MD  06/07/22 9581

## 2022-06-07 NOTE — ED TRIAGE NOTES
"Patient c/o hernia, uterus and ovarian pain. Has known naval hernia. Has been having this pain for a while but has worsened today. Patient is on testosterone and has not gotten menstrual period in 1 year and 7 months. Patient states bowel movements have recently become \"stringy\". ABCs intat      "

## 2022-06-08 LAB
C TRACH DNA SPEC QL NAA+PROBE: NEGATIVE
N GONORRHOEA DNA SPEC QL NAA+PROBE: NEGATIVE

## 2022-07-20 ENCOUNTER — APPOINTMENT (OUTPATIENT)
Dept: GENERAL RADIOLOGY | Facility: CLINIC | Age: 27
End: 2022-07-20
Attending: EMERGENCY MEDICINE
Payer: COMMERCIAL

## 2022-07-20 ENCOUNTER — HOSPITAL ENCOUNTER (EMERGENCY)
Facility: CLINIC | Age: 27
Discharge: HOME | End: 2022-07-20
Attending: EMERGENCY MEDICINE | Admitting: EMERGENCY MEDICINE
Payer: COMMERCIAL

## 2022-07-20 VITALS
WEIGHT: 135 LBS | TEMPERATURE: 97.5 F | HEART RATE: 68 BPM | RESPIRATION RATE: 18 BRPM | DIASTOLIC BLOOD PRESSURE: 68 MMHG | OXYGEN SATURATION: 99 % | SYSTOLIC BLOOD PRESSURE: 115 MMHG | BODY MASS INDEX: 23.17 KG/M2

## 2022-07-20 DIAGNOSIS — R07.9 CHEST PAIN, UNSPECIFIED TYPE: ICD-10-CM

## 2022-07-20 DIAGNOSIS — U07.1 INFECTION DUE TO 2019 NOVEL CORONAVIRUS: ICD-10-CM

## 2022-07-20 LAB
ANION GAP SERPL CALCULATED.3IONS-SCNC: 7 MMOL/L (ref 3–14)
ATRIAL RATE - MUSE: 79 BPM
BASOPHILS # BLD MANUAL: 0 10E3/UL (ref 0–0.2)
BASOPHILS NFR BLD MANUAL: 0 %
BUN SERPL-MCNC: 11 MG/DL (ref 7–30)
CALCIUM SERPL-MCNC: 8.7 MG/DL (ref 8.5–10.1)
CHLORIDE BLD-SCNC: 107 MMOL/L (ref 94–109)
CO2 SERPL-SCNC: 23 MMOL/L (ref 20–32)
CREAT SERPL-MCNC: 0.96 MG/DL (ref 0.52–1.04)
D DIMER PPP FEU-MCNC: 0.33 UG/ML FEU (ref 0–0.5)
DIASTOLIC BLOOD PRESSURE - MUSE: NORMAL MMHG
EOSINOPHIL # BLD MANUAL: 0 10E3/UL (ref 0–0.7)
EOSINOPHIL NFR BLD MANUAL: 0 %
ERYTHROCYTE [DISTWIDTH] IN BLOOD BY AUTOMATED COUNT: 13 % (ref 10–15)
FLUAV RNA SPEC QL NAA+PROBE: NEGATIVE
FLUBV RNA RESP QL NAA+PROBE: NEGATIVE
GFR SERPL CREATININE-BSD FRML MDRD: 83 ML/MIN/1.73M2
GLUCOSE BLD-MCNC: 85 MG/DL (ref 70–99)
HCT VFR BLD AUTO: 47.6 % (ref 35–47)
HGB BLD-MCNC: 14.7 G/DL (ref 11.7–15.7)
INTERPRETATION ECG - MUSE: NORMAL
LYMPHOCYTES # BLD MANUAL: 0.5 10E3/UL (ref 0.8–5.3)
LYMPHOCYTES NFR BLD MANUAL: 8 %
MCH RBC QN AUTO: 27 PG (ref 26.5–33)
MCHC RBC AUTO-ENTMCNC: 30.9 G/DL (ref 31.5–36.5)
MCV RBC AUTO: 87 FL (ref 78–100)
MONOCYTES # BLD MANUAL: 0.7 10E3/UL (ref 0–1.3)
MONOCYTES NFR BLD MANUAL: 10 %
NEUTROPHILS # BLD MANUAL: 5.6 10E3/UL (ref 1.6–8.3)
NEUTROPHILS NFR BLD MANUAL: 82 %
P AXIS - MUSE: 71 DEGREES
PLAT MORPH BLD: ABNORMAL
PLATELET # BLD AUTO: 227 10E3/UL (ref 150–450)
POTASSIUM BLD-SCNC: 3.8 MMOL/L (ref 3.4–5.3)
PR INTERVAL - MUSE: 158 MS
QRS DURATION - MUSE: 86 MS
QT - MUSE: 350 MS
QTC - MUSE: 401 MS
R AXIS - MUSE: 75 DEGREES
RBC # BLD AUTO: 5.45 10E6/UL (ref 3.8–5.2)
RBC MORPH BLD: ABNORMAL
RSV RNA SPEC NAA+PROBE: NEGATIVE
SARS-COV-2 RNA RESP QL NAA+PROBE: POSITIVE
SODIUM SERPL-SCNC: 137 MMOL/L (ref 133–144)
SYSTOLIC BLOOD PRESSURE - MUSE: NORMAL MMHG
T AXIS - MUSE: 32 DEGREES
TROPONIN I SERPL HS-MCNC: 4 NG/L
VENTRICULAR RATE- MUSE: 79 BPM
WBC # BLD AUTO: 6.8 10E3/UL (ref 4–11)

## 2022-07-20 PROCEDURE — 80048 BASIC METABOLIC PNL TOTAL CA: CPT | Performed by: EMERGENCY MEDICINE

## 2022-07-20 PROCEDURE — 85379 FIBRIN DEGRADATION QUANT: CPT | Performed by: EMERGENCY MEDICINE

## 2022-07-20 PROCEDURE — 96361 HYDRATE IV INFUSION ADD-ON: CPT

## 2022-07-20 PROCEDURE — 87637 SARSCOV2&INF A&B&RSV AMP PRB: CPT | Performed by: EMERGENCY MEDICINE

## 2022-07-20 PROCEDURE — 84484 ASSAY OF TROPONIN QUANT: CPT | Performed by: EMERGENCY MEDICINE

## 2022-07-20 PROCEDURE — 99285 EMERGENCY DEPT VISIT HI MDM: CPT | Mod: CS,25

## 2022-07-20 PROCEDURE — 250N000011 HC RX IP 250 OP 636: Performed by: EMERGENCY MEDICINE

## 2022-07-20 PROCEDURE — 85007 BL SMEAR W/DIFF WBC COUNT: CPT | Performed by: EMERGENCY MEDICINE

## 2022-07-20 PROCEDURE — 93005 ELECTROCARDIOGRAM TRACING: CPT

## 2022-07-20 PROCEDURE — C9803 HOPD COVID-19 SPEC COLLECT: HCPCS

## 2022-07-20 PROCEDURE — 71046 X-RAY EXAM CHEST 2 VIEWS: CPT

## 2022-07-20 PROCEDURE — 96374 THER/PROPH/DIAG INJ IV PUSH: CPT

## 2022-07-20 PROCEDURE — 85027 COMPLETE CBC AUTOMATED: CPT | Performed by: EMERGENCY MEDICINE

## 2022-07-20 PROCEDURE — 250N000013 HC RX MED GY IP 250 OP 250 PS 637: Performed by: EMERGENCY MEDICINE

## 2022-07-20 PROCEDURE — 36415 COLL VENOUS BLD VENIPUNCTURE: CPT | Performed by: EMERGENCY MEDICINE

## 2022-07-20 PROCEDURE — 258N000003 HC RX IP 258 OP 636: Performed by: EMERGENCY MEDICINE

## 2022-07-20 RX ORDER — ACETAMINOPHEN 500 MG
1000 TABLET ORAL ONCE
Status: COMPLETED | OUTPATIENT
Start: 2022-07-20 | End: 2022-07-20

## 2022-07-20 RX ORDER — SODIUM CHLORIDE 9 MG/ML
INJECTION, SOLUTION INTRAVENOUS CONTINUOUS
Status: DISCONTINUED | OUTPATIENT
Start: 2022-07-20 | End: 2022-07-20 | Stop reason: HOSPADM

## 2022-07-20 RX ORDER — KETOROLAC TROMETHAMINE 15 MG/ML
15 INJECTION, SOLUTION INTRAMUSCULAR; INTRAVENOUS ONCE
Status: COMPLETED | OUTPATIENT
Start: 2022-07-20 | End: 2022-07-20

## 2022-07-20 RX ADMIN — SODIUM CHLORIDE 1000 ML: 9 INJECTION, SOLUTION INTRAVENOUS at 02:24

## 2022-07-20 RX ADMIN — ACETAMINOPHEN 1000 MG: 500 TABLET, FILM COATED ORAL at 01:08

## 2022-07-20 RX ADMIN — KETOROLAC TROMETHAMINE 15 MG: 15 INJECTION, SOLUTION INTRAMUSCULAR; INTRAVENOUS at 05:04

## 2022-07-20 ASSESSMENT — ENCOUNTER SYMPTOMS
VOMITING: 1
CHILLS: 1
CHEST TIGHTNESS: 1
ABDOMINAL PAIN: 1
SHORTNESS OF BREATH: 1
DIARRHEA: 1
COUGH: 1
FEVER: 1
SORE THROAT: 1

## 2022-07-20 NOTE — ED PROVIDER NOTES
"  History   Chief Complaint:  Generalized Body Aches       The history is provided by the patient.      Monica Matos is a 27 year old who presents with generalized body aches. Patient reports he has been feeling body aches, nausea, dizziness and disoriented for 3 days, which he says is progressively getting worse. Confirms a fever (no temp), chills, cough, congestion, sore throat, chest heaviness, shortness of breath, diarrhea, dry-heave vomiting and abdominal discomfort. Describes the abdominal discomfort as \"feeling like I am falling\". He is not eating as much due to the discomfort, and states the chest heaviness is still present here in the ED. Denies any urinary or  symptoms, allergies, lung conditions or use of alcohol. Patient smokes cannabis daily for anxiety and takes testosterone. Took 800 mg of ibuprofen at 0030. No known sickness exposures and is not vaccinated against COVID-19.     Review of Systems   Constitutional: Positive for chills and fever.   HENT: Positive for congestion and sore throat.    Respiratory: Positive for cough, chest tightness and shortness of breath.    Gastrointestinal: Positive for abdominal pain (\"discomfort\"), diarrhea and vomiting (dry-heave).   Genitourinary: Negative.    All other systems reviewed and are negative.    Allergies:  No Known Allergies    Medications:  albuterol (PROAIR HFA/PROVENTIL HFA/VENTOLIN HFA) 108 (90 Base) MCG/ACT inhaler  testosterone cypionate (DEPOTESTOSTERONE) 100 MG/ML injection    Past Medical History:     Stupor  Heart palpitations  Depression   Anxiety  Hernia     Social History:  The patient presents to the ED with .     Physical Exam     Patient Vitals for the past 24 hrs:   BP Temp Temp src Pulse Resp SpO2 Weight   07/20/22 0418 -- 97.5  F (36.4  C) Oral -- -- -- --   07/20/22 0414 115/68 -- -- 68 18 99 % --   07/20/22 0108 -- -- -- 105 -- -- --   07/20/22 0105 138/84 99.9  F (37.7  C) Temporal -- 18 98 % 61.2 kg (135 lb) "       Physical Exam  General: Adult sitting upright  Eyes: PERRL, Conjunctive within normal limits  ENT: Moist mucous membranes, oropharynx clear.   CV: Normal S1S2, no murmur, rub or gallop. Regular rate and rhythm  Resp: Clear to auscultation bilaterally, no wheezes, rales or rhonchi. Normal respiratory effort.  GI: Abdomen is soft, nontender and nondistended. No palpable masses. No rebound or guarding.  MSK: No edema. Nontender. Normal active range of motion.  Skin: Warm and dry. No rashes or lesions or ecchymoses on visible skin.  Neuro: Alert and oriented. Responds appropriately to all questions and commands. No focal findings appreciated. Normal muscle tone.  Psych: Normal mood and affect. Pleasant.    Emergency Department Course   ECG  ECG results from 07/20/22   EKG 12-lead, tracing only     Value    Systolic Blood Pressure     Diastolic Blood Pressure     Ventricular Rate 79    Atrial Rate 79    TX Interval 158    QRS Duration 86        QTc 401    P Axis 71    R AXIS 75    T Axis 32    Interpretation ECG      Sinus rhythm  Normal ECG  When compared with ECG of 09-MAY-2022 15:08,  TX interval has increased  Vent. rate has increased BY  32 BPM  QT has lengthened         Imaging:  XR Chest 2 Views   Final Result   IMPRESSION: Negative chest.        Report per radiology    Laboratory:  Labs Ordered and Resulted from Time of ED Arrival to Time of ED Departure   INFLUENZA A/B & SARS-COV2 PCR MULTIPLEX - Abnormal       Result Value    Influenza A PCR Negative      Influenza B PCR Negative      RSV PCR Negative      SARS CoV2 PCR Positive (*)    CBC WITH PLATELETS AND DIFFERENTIAL - Abnormal    WBC Count 6.8      RBC Count 5.45 (*)     Hemoglobin 14.7      Hematocrit 47.6 (*)     MCV 87      MCH 27.0      MCHC 30.9 (*)     RDW 13.0      Platelet Count 227     DIFFERENTIAL - Abnormal    % Neutrophils 82      % Lymphocytes 8      % Monocytes 10      % Eosinophils 0      % Basophils 0      Absolute Neutrophils  5.6      Absolute Lymphocytes 0.5 (*)     Absolute Monocytes 0.7      Absolute Eosinophils 0.0      Absolute Basophils 0.0      RBC Morphology Confirmed RBC Indices      Platelet Assessment        Value: Automated Count Confirmed. Platelet morphology is normal.   BASIC METABOLIC PANEL - Normal    Sodium 137      Potassium 3.8      Chloride 107      Carbon Dioxide (CO2) 23      Anion Gap 7      Urea Nitrogen 11      Creatinine 0.96      Calcium 8.7      Glucose 85      GFR Estimate 83     TROPONIN I - Normal    Troponin I High Sensitivity 4     D DIMER QUANTITATIVE - Normal    D-Dimer Quantitative 0.33          Emergency Department Course:         Reviewed:  I reviewed nursing notes, vitals, past medical history and Care Everywhere    Assessments/Consults:  ED Course as of 07/20/22 0505   Wed Jul 20, 2022 0214 I obtained history and examined the patient.    0420 I rechecked the patient and explained findings.    0505 I rechecked the patient and explained findings.  No new concerns.  Feels comfortable with plan for discharge.     Interventions:  Medications   acetaminophen (TYLENOL) tablet 1,000 mg (1,000 mg Oral Given 7/20/22 0108)   ketorolac (TORADOL) injection 15 mg (15 mg Intravenous Given 7/20/22 0504)       Disposition:  The patient was discharged to home.     Impression & Plan     Medical Decision Making:  Monica Matos comes today with symptoms that are concerning for possible COVID19, and confirmed by a positive swab here in the ED.  Vital signs are normal.  The patient is not hypoxic.  The patient was reporting chest pain and shortness of breath, however  work of breathing is normal and there is no hypoxia.  Mentation is normal.  Comprehensive evaluation did not show any findings that would be concerning on laboratory analysis for pulmonary embolism or myocarditis.  ECG did not show any worrisome findings.  Unlikely alternative cause for chest pain such as CHF, pleural effusion, pneumothorax, etc.  The  patient does not require hospitalization.  At this time, I believe the patient is safe for discharge home.  The patient was given the current [place] Department of Health guidelines on self isolation.  They are asked to follow-up via telemetry medicine.  They are asked to drink plenty of fluids.  They are asked to return if they develop severe difficulty in breathing or worsening requiring hospitalization.     Covid-19  Monica Matos was evaluated during a global COVID-19 pandemic, which necessitated consideration that the patient might be at risk for infection with the SARS-CoV-2 virus that causes COVID-19.   Applicable protocols for evaluation were followed during the patient's care.   COVID-19 was considered as part of the patient's evaluation. The plan for testing is:  a test was obtained during this visit.    Diagnosis:    ICD-10-CM    1. Infection due to 2019 novel coronavirus  U07.1    2. Chest pain, unspecified type  R07.9        Scribe Disclosure:  I, VANESSA GROSSMAN, am serving as a scribe at 1:59 AM on 7/20/2022 to document services personally performed by Sarah Jaquez MD based on my observations and the provider's statements to me.        Sarah Jaquez MD  07/20/22 0574

## 2022-07-21 ENCOUNTER — PATIENT OUTREACH (OUTPATIENT)
Dept: CARE COORDINATION | Facility: CLINIC | Age: 27
End: 2022-07-21

## 2022-07-21 DIAGNOSIS — Z71.89 OTHER SPECIFIED COUNSELING: ICD-10-CM

## 2022-07-21 NOTE — PROGRESS NOTES
"Clinic Care Coordination Contact  [facility] [place]: Post-Discharge Note  SITUATION                                                      Admission:    Admission Date: 07/20/22   Reason for Admission: 1. Infection due to 2019 novel coronavirus   2. Chest pain, unspecified type  Discharge:   Discharge Date: 07/20/22  Discharge Diagnosis: 1. Infection due to 2019 novel coronavirus   2. Chest pain, unspecified type    BACKGROUND                                                      Per hospital discharge summary and inpatient provider notes:    Monica Matos is a 27 year old who presents with generalized body aches. Patient reports he has been feeling body aches, nausea, dizziness and disoriented for 3 days, which he says is progressively getting worse. Confirms a fever (no temp), chills, cough, congestion, sore throat, chest heaviness, shortness of breath, diarrhea, dry-heave vomiting and abdominal discomfort. Describes the abdominal discomfort as \"feeling like I am falling\". He is not eating as much due to the discomfort, and states the chest heaviness is still present here in the ED. Denies any urinary or  symptoms, allergies, lung conditions or use of alcohol. Patient smokes cannabis daily for anxiety and takes testosterone. Took 800 mg of ibuprofen at 0030. No known sickness exposures and is not vaccinated against COVID-19.     ASSESSMENT      Enrollment  Primary Care Care Coordination Status: Not a Candidate    Discharge Assessment  How are you doing now that you are home?: Last night symptoms seemed worse but today feeling a lot better. Symptoms are improving today.  How are your symptoms? (Red Flag symptoms escalate to triage hotline per guidelines): Improved  Do you feel your condition is stable enough to be safe at home until your provider visit?: Yes  Does the patient have their discharge instructions? : Yes  Does the patient have questions regarding their discharge instructions? : Yes (see " comment)  Were you started on any new medications or were there changes to any of your previous medications? : No  Does the patient have all of their medications?:  (N/A No new medications)  Do you have questions regarding any of your medications? :  (N/A No new medications)  Do you have all of your needed medical supplies or equipment (DME)?  (i.e. oxygen tank, CPAP, cane, etc.): Yes (Pulse oximeter)  Discharge follow-up appointment scheduled within 14 calendar days? : No  Is patient agreeable to assistance with scheduling? : No (Currently working on setting it up.)         Post-op (Clinicians Only)  Did the patient have surgery or a procedure: No    Patient thought they would need to go back into the ED last night due to their headache.  Today the headache is pretty well managed with Tylenol and is improving.  Patient feels a lot better today.  Patient states the lowest their oxygen level has been is 95% but is mostly 96-97%.  Patient was given 24/7 triage line information and was advised to contact PCP to set up virtual appointment for follow up.      PLAN                                                      Outpatient Plan:   Schedule an appointment with your primary care clinic in 2 days (7/22/2022)    No future appointments.      For any urgent concerns, please contact our 24 hour nurse triage line: [phone] ([phone])         Livia Haynes RN

## 2022-09-06 ENCOUNTER — HOSPITAL ENCOUNTER (EMERGENCY)
Facility: CLINIC | Age: 27
Discharge: HOME | End: 2022-09-06
Attending: EMERGENCY MEDICINE | Admitting: EMERGENCY MEDICINE
Payer: COMMERCIAL

## 2022-09-06 VITALS
RESPIRATION RATE: 18 BRPM | OXYGEN SATURATION: 100 % | DIASTOLIC BLOOD PRESSURE: 83 MMHG | TEMPERATURE: 98.6 F | SYSTOLIC BLOOD PRESSURE: 119 MMHG | WEIGHT: 146.83 LBS | HEART RATE: 82 BPM | BODY MASS INDEX: 25.2 KG/M2

## 2022-09-06 DIAGNOSIS — R22.1 LOCALIZED SWELLING, MASS AND LUMP, NECK: ICD-10-CM

## 2022-09-06 PROCEDURE — 99282 EMERGENCY DEPT VISIT SF MDM: CPT

## 2022-09-06 ASSESSMENT — ACTIVITIES OF DAILY LIVING (ADL): ADLS_ACUITY_SCORE: 33

## 2022-09-06 ASSESSMENT — ENCOUNTER SYMPTOMS
DIAPHORESIS: 1
NECK PAIN: 1

## 2022-09-06 NOTE — ED TRIAGE NOTES
Patient presents to triage with c/o a lump on R side of neck and neck swelling. Patient stated this has been going on intermittently and sometimes causes facial and eye swelling. Denies any known allergies. Denies SOB or airway involvement.      Triage Assessment     Row Name 09/06/22 4039       Triage Assessment (Adult)    Airway WDL WDL       Respiratory WDL    Respiratory WDL WDL       Cardiac WDL    Cardiac WDL WDL

## 2022-09-07 NOTE — ED PROVIDER NOTES
"  History   Chief Complaint:  neck swelling       The history is provided by the patient.      Monica Matos is a 27 year old female who presents with neck swelling for the past 2 years intermittently. The patient complains of swelling on the the right side of their neck. The patient notes that they have a constant throbbing pain in the region which is worse when it swells up. They state that the lump gets bigger and smaller. The patient also complains of vision problems in which they report their eyes feel like they are \"swelling,\" night sweats, and mood swings. The patient reports that they were taking Testosterone until about 2 months ago. They report a recent history of elevated white blood cell counts.     Review of Systems   Constitutional: Positive for diaphoresis.   Musculoskeletal: Positive for neck pain.        (+) lump on neck   All other systems reviewed and are negative.        Allergies:  The patient denies any known drug allergies.     Medications:  The patient denies any active medications.     Past Medical History:     Stupor   Alcoholic intoxication with complication   MDD   Borderline personality disorder   Frequent headaches  Depression   Anxiety   Umbilical hernia      Social History:  Patient came from home.  Patient is accompanied in the ED.    Physical Exam     Patient Vitals for the past 24 hrs:   BP Temp Temp src Pulse Resp SpO2 Weight   09/06/22 1807 119/83 98.6  F (37  C) Temporal 82 18 100 % 66.6 kg (146 lb 13.2 oz)       Physical Exam  Vitals and nursing note reviewed.   Constitutional:       General: She is not in acute distress.     Appearance: She is not ill-appearing.   HENT:      Head: Normocephalic and atraumatic.      Right Ear: External ear normal.      Left Ear: External ear normal.      Nose: Nose normal.   Eyes:      Extraocular Movements: Extraocular movements intact.      Conjunctiva/sclera: Conjunctivae normal.   Pulmonary:      Effort: Pulmonary effort is normal. No " respiratory distress.   Musculoskeletal:         General: No deformity or signs of injury.      Cervical back: Normal range of motion and neck supple. No rigidity or tenderness.   Lymphadenopathy:      Cervical: No cervical adenopathy.   Skin:     Coloration: Skin is not pale.      Findings: No rash.   Neurological:      Mental Status: She is alert.   Psychiatric:         Mood and Affect: Mood normal.         Behavior: Behavior normal.           Emergency Department Course     Emergency Department Course:       Reviewed:  I reviewed nursing notes, vitals, past medical history and Care Everywhere    Assessments:  1900 I obtained history and examined the patient as noted above.     Disposition:  The patient was discharged to home.     Impression & Plan     CMS Diagnoses: None    Medical Decision Makin-year-old female with 2 years of intermittent right-sided focal neck swelling.  Patient admits that the area is not currently swollen.  Given that the area is not swollen at this time it is hard for us to evaluate what could be causing this.  Low suspicion for lymphoma or metastatic disease given the waxing and waning size of the lesion.  Could be a reactive lymph node to some sort of upper respiratory infection.  However given that this has been ongoing for years and not currently causing problems, do not feel that emergent work-up is needed at this time.  I did recommend that she follow-up with primary care for additional testing as needed.  Discussed return precautions.    Diagnosis:    ICD-10-CM    1. Localized swelling, mass and lump, neck  R22.1        Discharge Medications:  Discharge Medication List as of 2022  7:09 PM          Scribe Disclosure:  Freeman SANDOVALkervinJessie, am serving as a scribe at 7:00 PM on 2022 to document services personally performed by Sonu Fox MD based on my observations and the provider's statements to me.          Sonu Fox MD  [phone]

## 2022-10-09 ENCOUNTER — HOSPITAL ENCOUNTER (EMERGENCY)
Facility: CLINIC | Age: 27
Discharge: HOME | End: 2022-10-09
Attending: EMERGENCY MEDICINE | Admitting: EMERGENCY MEDICINE
Payer: COMMERCIAL

## 2022-10-09 DIAGNOSIS — R00.2 PALPITATIONS: ICD-10-CM

## 2022-10-09 DIAGNOSIS — R10.13 EPIGASTRIC PAIN: ICD-10-CM

## 2022-10-09 LAB
ANION GAP SERPL CALCULATED.3IONS-SCNC: 11 MMOL/L (ref 7–15)
BUN SERPL-MCNC: 16.7 MG/DL (ref 6–20)
CALCIUM SERPL-MCNC: 9.2 MG/DL (ref 8.6–10)
CHLORIDE SERPL-SCNC: 100 MMOL/L (ref 98–107)
CREAT SERPL-MCNC: 0.9 MG/DL (ref 0.51–0.95)
DEPRECATED HCO3 PLAS-SCNC: 27 MMOL/L (ref 22–29)
ERYTHROCYTE [DISTWIDTH] IN BLOOD BY AUTOMATED COUNT: 12.7 % (ref 10–15)
FLUAV RNA SPEC QL NAA+PROBE: NEGATIVE
FLUBV RNA RESP QL NAA+PROBE: NEGATIVE
GFR SERPL CREATININE-BSD FRML MDRD: 89 ML/MIN/1.73M2
GLUCOSE SERPL-MCNC: 60 MG/DL (ref 70–99)
HCT VFR BLD AUTO: 44.8 % (ref 35–47)
HGB BLD-MCNC: 14 G/DL (ref 11.7–15.7)
MCH RBC QN AUTO: 27 PG (ref 26.5–33)
MCHC RBC AUTO-ENTMCNC: 31.3 G/DL (ref 31.5–36.5)
MCV RBC AUTO: 87 FL (ref 78–100)
PLATELET # BLD AUTO: 298 10E3/UL (ref 150–450)
POTASSIUM SERPL-SCNC: 3.6 MMOL/L (ref 3.4–5.3)
RBC # BLD AUTO: 5.18 10E6/UL (ref 3.8–5.2)
RSV RNA SPEC NAA+PROBE: NEGATIVE
SARS-COV-2 RNA RESP QL NAA+PROBE: NEGATIVE
SODIUM SERPL-SCNC: 138 MMOL/L (ref 136–145)
TROPONIN T SERPL HS-MCNC: 6 NG/L
WBC # BLD AUTO: 8.5 10E3/UL (ref 4–11)

## 2022-10-09 PROCEDURE — 85027 COMPLETE CBC AUTOMATED: CPT | Performed by: EMERGENCY MEDICINE

## 2022-10-09 PROCEDURE — 93005 ELECTROCARDIOGRAM TRACING: CPT

## 2022-10-09 PROCEDURE — 99284 EMERGENCY DEPT VISIT MOD MDM: CPT

## 2022-10-09 PROCEDURE — 36415 COLL VENOUS BLD VENIPUNCTURE: CPT | Performed by: EMERGENCY MEDICINE

## 2022-10-09 PROCEDURE — 84484 ASSAY OF TROPONIN QUANT: CPT | Performed by: EMERGENCY MEDICINE

## 2022-10-09 PROCEDURE — C9803 HOPD COVID-19 SPEC COLLECT: HCPCS

## 2022-10-09 PROCEDURE — 85007 BL SMEAR W/DIFF WBC COUNT: CPT | Performed by: EMERGENCY MEDICINE

## 2022-10-09 PROCEDURE — 87637 SARSCOV2&INF A&B&RSV AMP PRB: CPT | Performed by: EMERGENCY MEDICINE

## 2022-10-09 PROCEDURE — 80048 BASIC METABOLIC PNL TOTAL CA: CPT | Performed by: EMERGENCY MEDICINE

## 2022-10-09 ASSESSMENT — ACTIVITIES OF DAILY LIVING (ADL): ADLS_ACUITY_SCORE: 35

## 2022-10-09 NOTE — ED PROVIDER NOTES
Visit Date: 10/09/2022    CHIEF COMPLAINT:  Chest pain, palpitations.    HISTORY OF PRESENT ILLNESS:  This is a 27-year-old who presents with 2-3 months of intermittent palpitations.  They have been very stressed at work at [employer].  They state they have been drinking probably too much alcohol over that time and do have a history of ulcers.  The pain is located mainly in the epigastric region. Last evening, the pain moved more to the left upper chest, which was concerning.  Monica took their heart rate at home and showed it to be 120 and was advised by the nursing line to come into the ER.    PAST MEDICAL HISTORY:  None.    PAST SURGICAL HISTORY:  None.    MEDICATIONS:    1.  Albuterol as needed.  2.  Omega 3 fatty acids.  3.  Testosterone injections.    ALLERGIES:  NONE.    SOCIAL HISTORY:  The patient presents with a female friend.    REVIEW OF SYSTEMS:  Pertinent 10-point review of systems is negative except for that noted in the HPI.    PHYSICAL EXAMINATION:    GENERAL:  The patient is sitting in bed and comfortable-appearing.  VITAL SIGNS:  Blood pressure 120/84, heart rate 64, respiratory rate 20, oxygen 100% on room air, temperature 99.2 degrees.  HEENT:  Atraumatic.  Moist mucous membranes.  CARDIOVASCULAR:  Regular rhythm, normal rate.  No murmurs.  RESPIRATORY:  Clear to auscultation bilaterally without wheezes, rales, or rhonchi.  GASTROINTESTINAL:  Soft, nondistended.  Normal bowel sounds.  No guarding or rigidity.  SKIN:  No pertinent skin findings.  NEUROLOGIC:  The patient is alert and oriented x4.  PSYCHIATRIC:  The patient has normal mood and affect.    LABORATORY EVALUATION AND DIAGNOSTIC STUDIES:  A 12-lead electrocardiogram shows normal sinus rhythm with a ventricular rate of 66 beats per minute.  Normal axis.  Normal intervals.  No ischemic changes noted.    LABORATORY DATA:  Nasal swab is negative for COVID, RSV and influenza.      CBC shows a hemoglobin of 14.0, platelet count of 298.   Otherwise, normal.  Troponin is 6.  Basic metabolic panel is normal with a creatinine of 0.9.    MEDICAL DECISION MAKING:  This is a 27-year-old who presents with epigastric abdominal pain and intermittent palpitations for several months.  EKG is nonischemic.  No electrical activity that would predispose them to malignant arrhythmias, including but not limited to Phuc-Parkinson-White, Brugada syndrome, prolonged QT, etc.  Pain mainly localized to the epigastrium and with the alcohol use and stress, I feel this is most likely related to gastritis or even early peptic ulcer disease.  I have recommended decreasing alcohol use.  Will start them on a proton pump inhibitor.  Clinical concern for pulmonary embolism, aortic dissection, pneumonia, pneumothorax, etc. is very low.  Abdominal exam is otherwise reassuring and they will be discharged home.    DIAGNOSES:    1.  Epigastric abdominal pain.  2.  Palpitations  3.  Stress.   4.  Alcohol abuse.    PLAN OF CARE:  As above.    Carlos Ray MD        D: 10/09/2022   T: 10/09/2022   MT: [id number]    Name:     FELI NOGUEIRA  MRN:      [id number]        Account:    [id number]   :      1995           Visit Date: 10/09/2022     Document: [id number]

## 2022-10-10 LAB
ATRIAL RATE - MUSE: 66 BPM
BASOPHILS # BLD MANUAL: 0 10E3/UL (ref 0–0.2)
BASOPHILS NFR BLD MANUAL: 0 %
DIASTOLIC BLOOD PRESSURE - MUSE: NORMAL MMHG
EOSINOPHIL # BLD MANUAL: 0.1 10E3/UL (ref 0–0.7)
EOSINOPHIL NFR BLD MANUAL: 1 %
INTERPRETATION ECG - MUSE: NORMAL
LYMPHOCYTES # BLD MANUAL: 1.9 10E3/UL (ref 0.8–5.3)
LYMPHOCYTES NFR BLD MANUAL: 22 %
MONOCYTES # BLD MANUAL: 0.3 10E3/UL (ref 0–1.3)
MONOCYTES NFR BLD MANUAL: 4 %
NEUTROPHILS # BLD MANUAL: 6.1 10E3/UL (ref 1.6–8.3)
NEUTROPHILS NFR BLD MANUAL: 72 %
P AXIS - MUSE: 72 DEGREES
PATH REV: ABNORMAL
PLASMA CELLS # BLD MANUAL: 0.1 10E3/UL
PLASMA CELLS NFR BLD MANUAL: 1 %
PLAT MORPH BLD: ABNORMAL
PR INTERVAL - MUSE: 158 MS
QRS DURATION - MUSE: 86 MS
QT - MUSE: 368 MS
QTC - MUSE: 385 MS
R AXIS - MUSE: 79 DEGREES
RBC MORPH BLD: ABNORMAL
SYSTOLIC BLOOD PRESSURE - MUSE: NORMAL MMHG
T AXIS - MUSE: 61 DEGREES
VENTRICULAR RATE- MUSE: 66 BPM

## 2022-10-17 ENCOUNTER — NURSE TRIAGE (OUTPATIENT)
Dept: NURSING | Facility: CLINIC | Age: 27
End: 2022-10-17

## 2022-10-17 NOTE — TELEPHONE ENCOUNTER
Triage Call    Pt calling with concern that she was told her labs were OK when she went to the ED on 10/09/22, but says she was just looking at her paperwork, and says her labs were abnormal.    Advised pt contact her PCP to discuss concerns.  Pt ended call.    Ana Rosa Nicholas, RN    Reason for Disposition    [1] Caller requesting NON-URGENT health information AND [2] PCP's office is the best resource    Additional Information    Negative: [1] Caller is not with the adult (patient) AND [2] reporting urgent symptoms    Negative: Lab result questions    Negative: Medication questions    Negative: Caller can't be reached by phone    Negative: Caller has already spoken to PCP or another triager    Negative: RN needs further essential information from caller in order to complete triage    Negative: Requesting regular office appointment    Protocols used: INFORMATION ONLY CALL - NO TRIAGE-[facility]

## 2022-12-21 NOTE — PLAN OF CARE
.ICU End of Shift Summary.  For vital signs and complete assessments, please see documentation flowsheets.     Pertinent assessments: Patient alert/oriented.  Up independently in the room.  T max 100.5/last check was 98.6.  Tele SR but patient's HR does go down to 50's sometimes upper 40's when sleeping.  BP stable.  Patient is on RA.  Denies any pain.  Does have a nonproductive cough; LS clear.  Robitussin given x 1.  2 PIV S.L.  Tolerating regular diet.  Calm and cooperative all night.    Major Shift Events: as above  Plan (Upcoming Events): continue with current plan of care  Discharge/Transfer Needs: Back home when able; patient lives in [place].            Stop the amoxicillin.  SLW

## 2022-12-24 ENCOUNTER — HOSPITAL ENCOUNTER (EMERGENCY)
Facility: CLINIC | Age: 27
Discharge: HOME | End: 2022-12-25
Attending: EMERGENCY MEDICINE | Admitting: EMERGENCY MEDICINE
Payer: COMMERCIAL

## 2022-12-24 VITALS
OXYGEN SATURATION: 98 % | SYSTOLIC BLOOD PRESSURE: 117 MMHG | HEART RATE: 64 BPM | RESPIRATION RATE: 18 BRPM | DIASTOLIC BLOOD PRESSURE: 77 MMHG | TEMPERATURE: 98 F

## 2022-12-24 DIAGNOSIS — R10.13 ABDOMINAL PAIN, EPIGASTRIC: ICD-10-CM

## 2022-12-24 DIAGNOSIS — F10.930 ALCOHOL WITHDRAWAL SYNDROME WITHOUT COMPLICATION (H): ICD-10-CM

## 2022-12-24 LAB
ABO/RH(D): NORMAL
ALBUMIN SERPL BCG-MCNC: 5 G/DL (ref 3.5–5.2)
ALP SERPL-CCNC: 47 U/L (ref 35–104)
ALT SERPL W P-5'-P-CCNC: 23 U/L (ref 10–35)
ANION GAP SERPL CALCULATED.3IONS-SCNC: 10 MMOL/L (ref 7–15)
ANTIBODY SCREEN: NEGATIVE
AST SERPL W P-5'-P-CCNC: 26 U/L (ref 10–35)
BILIRUB SERPL-MCNC: 0.3 MG/DL
BUN SERPL-MCNC: 12.4 MG/DL (ref 6–20)
CALCIUM SERPL-MCNC: 9.2 MG/DL (ref 8.6–10)
CHLORIDE SERPL-SCNC: 100 MMOL/L (ref 98–107)
CREAT SERPL-MCNC: 0.85 MG/DL (ref 0.51–0.95)
DEPRECATED HCO3 PLAS-SCNC: 28 MMOL/L (ref 22–29)
ERYTHROCYTE [DISTWIDTH] IN BLOOD BY AUTOMATED COUNT: 12.9 % (ref 10–15)
ETHANOL SERPL-MCNC: <0.01 G/DL
GFR SERPL CREATININE-BSD FRML MDRD: >90 ML/MIN/1.73M2
GLUCOSE SERPL-MCNC: 93 MG/DL (ref 70–99)
HCG SERPL QL: NEGATIVE
HCT VFR BLD AUTO: 44.5 % (ref 35–47)
HGB BLD-MCNC: 14.5 G/DL (ref 11.7–15.7)
HOLD SPECIMEN: NORMAL
HOLD SPECIMEN: NORMAL
INR PPP: 0.94 (ref 0.85–1.15)
LIPASE SERPL-CCNC: 27 U/L (ref 13–60)
MAGNESIUM SERPL-MCNC: 1.9 MG/DL (ref 1.7–2.3)
MCH RBC QN AUTO: 27.4 PG (ref 26.5–33)
MCHC RBC AUTO-ENTMCNC: 32.6 G/DL (ref 31.5–36.5)
MCV RBC AUTO: 84 FL (ref 78–100)
PLATELET # BLD AUTO: 319 10E3/UL (ref 150–450)
POTASSIUM SERPL-SCNC: 4.2 MMOL/L (ref 3.4–5.3)
PROT SERPL-MCNC: 7.9 G/DL (ref 6.4–8.3)
RBC # BLD AUTO: 5.3 10E6/UL (ref 3.8–5.2)
SODIUM SERPL-SCNC: 138 MMOL/L (ref 136–145)
SPECIMEN EXPIRATION DATE: NORMAL
WBC # BLD AUTO: 6.9 10E3/UL (ref 4–11)

## 2022-12-24 PROCEDURE — 84703 CHORIONIC GONADOTROPIN ASSAY: CPT | Performed by: EMERGENCY MEDICINE

## 2022-12-24 PROCEDURE — 96374 THER/PROPH/DIAG INJ IV PUSH: CPT

## 2022-12-24 PROCEDURE — 83690 ASSAY OF LIPASE: CPT | Performed by: EMERGENCY MEDICINE

## 2022-12-24 PROCEDURE — 86850 RBC ANTIBODY SCREEN: CPT | Performed by: EMERGENCY MEDICINE

## 2022-12-24 PROCEDURE — 36415 COLL VENOUS BLD VENIPUNCTURE: CPT | Performed by: EMERGENCY MEDICINE

## 2022-12-24 PROCEDURE — 83735 ASSAY OF MAGNESIUM: CPT | Performed by: EMERGENCY MEDICINE

## 2022-12-24 PROCEDURE — 96361 HYDRATE IV INFUSION ADD-ON: CPT

## 2022-12-24 PROCEDURE — C9113 INJ PANTOPRAZOLE SODIUM, VIA: HCPCS | Performed by: EMERGENCY MEDICINE

## 2022-12-24 PROCEDURE — 250N000011 HC RX IP 250 OP 636: Performed by: EMERGENCY MEDICINE

## 2022-12-24 PROCEDURE — 96376 TX/PRO/DX INJ SAME DRUG ADON: CPT

## 2022-12-24 PROCEDURE — 85610 PROTHROMBIN TIME: CPT | Performed by: EMERGENCY MEDICINE

## 2022-12-24 PROCEDURE — 80053 COMPREHEN METABOLIC PANEL: CPT | Performed by: EMERGENCY MEDICINE

## 2022-12-24 PROCEDURE — 258N000003 HC RX IP 258 OP 636: Performed by: EMERGENCY MEDICINE

## 2022-12-24 PROCEDURE — 99285 EMERGENCY DEPT VISIT HI MDM: CPT | Mod: 25

## 2022-12-24 PROCEDURE — 85027 COMPLETE CBC AUTOMATED: CPT | Performed by: EMERGENCY MEDICINE

## 2022-12-24 PROCEDURE — 82077 ASSAY SPEC XCP UR&BREATH IA: CPT | Performed by: EMERGENCY MEDICINE

## 2022-12-24 PROCEDURE — 96375 TX/PRO/DX INJ NEW DRUG ADDON: CPT

## 2022-12-24 RX ORDER — LORAZEPAM 2 MG/ML
0.5 INJECTION INTRAMUSCULAR ONCE
Status: COMPLETED | OUTPATIENT
Start: 2022-12-24 | End: 2022-12-24

## 2022-12-24 RX ORDER — CHLORDIAZEPOXIDE HYDROCHLORIDE 25 MG/1
25 CAPSULE, GELATIN COATED ORAL 3 TIMES DAILY PRN
Qty: 10 CAPSULE | Refills: 0 | Status: SHIPPED | OUTPATIENT
Start: 2022-12-24

## 2022-12-24 RX ORDER — PANTOPRAZOLE SODIUM 40 MG/1
40 TABLET, DELAYED RELEASE ORAL DAILY
Qty: 30 TABLET | Refills: 0 | Status: SHIPPED | OUTPATIENT
Start: 2022-12-24 | End: 2023-01-23

## 2022-12-24 RX ORDER — ONDANSETRON 4 MG/1
4 TABLET, ORALLY DISINTEGRATING ORAL EVERY 8 HOURS PRN
Qty: 10 TABLET | Refills: 0 | Status: SHIPPED | OUTPATIENT
Start: 2022-12-24 | End: 2022-12-27

## 2022-12-24 RX ORDER — ONDANSETRON 2 MG/ML
4 INJECTION INTRAMUSCULAR; INTRAVENOUS ONCE
Status: COMPLETED | OUTPATIENT
Start: 2022-12-24 | End: 2022-12-24

## 2022-12-24 RX ORDER — SUCRALFATE 1 G/1
1 TABLET ORAL 4 TIMES DAILY
Qty: 56 TABLET | Refills: 0 | Status: SHIPPED | OUTPATIENT
Start: 2022-12-24 | End: 2023-01-07

## 2022-12-24 RX ADMIN — ONDANSETRON 4 MG: 2 INJECTION INTRAMUSCULAR; INTRAVENOUS at 05:18

## 2022-12-24 RX ADMIN — PANTOPRAZOLE SODIUM 40 MG: 40 INJECTION, POWDER, FOR SOLUTION INTRAVENOUS at 03:13

## 2022-12-24 RX ADMIN — SODIUM CHLORIDE 1000 ML: 9 INJECTION, SOLUTION INTRAVENOUS at 02:40

## 2022-12-24 RX ADMIN — ONDANSETRON 4 MG: 2 INJECTION INTRAMUSCULAR; INTRAVENOUS at 02:43

## 2022-12-24 RX ADMIN — LORAZEPAM 0.5 MG: 2 INJECTION INTRAMUSCULAR; INTRAVENOUS at 03:20

## 2022-12-24 RX ADMIN — LORAZEPAM 0.5 MG: 2 INJECTION INTRAMUSCULAR; INTRAVENOUS at 03:13

## 2022-12-24 ASSESSMENT — ACTIVITIES OF DAILY LIVING (ADL)
ADLS_ACUITY_SCORE: 35

## 2022-12-24 ASSESSMENT — ENCOUNTER SYMPTOMS
ABDOMINAL PAIN: 1
TREMORS: 1
VOMITING: 1
DIARRHEA: 1

## 2022-12-24 NOTE — ED PROVIDER NOTES
"  History   Chief Complaint:  Abdominal Pain       The history is provided by the patient.      Monica \"Stef\" IDANIA Matos is a 27 year old male with history of alcohol abuse and an umbilical hernia who presents with abdominal pain. Patient reports he is experiencing epigastric pain, which began at 0130 accompanied by vomiting.  Has been having a lot of nausea recently as well as epigastric pain.  Started vomiting tonight that was normal in color but then towards the end changed to what look like blood.  Also notes that he has been drinking more alcohol as of late, and is experiencing episodes of darker diarrhea. Monica thinks he may be going into withdrawal, as he has become tremulous in the ED. He does not have a history of seizures with the withdrawals.     Review of Systems   Gastrointestinal: Positive for abdominal pain, diarrhea and vomiting.   Neurological: Positive for tremors.   All other systems reviewed and are negative.    Allergies:  No Known Allergies    Medications:  albuterol  testosterone cypionate (DEPOTESTOSTERONE) 100 MG/ML injection    Past Medical History:     Alcohol abuse  Depression  Personality disorder  Umbilical hernia    Social History:  The patient presents to the ED with significant other  PCP: No Ref-Primary, Physician     Physical Exam     Patient Vitals for the past 24 hrs:   BP Temp Temp src Pulse Resp SpO2   12/24/22 0445 125/84 -- -- 72 -- 98 %   12/24/22 0330 (!) 139/92 -- -- 102 -- 98 %   12/24/22 0325 (!) 148/102 -- -- -- -- --   12/24/22 0315 (!) 142/105 -- -- -- -- --   12/24/22 0245 (!) 132/100 -- -- 88 -- --   12/24/22 0237 (!) 150/95 98  F (36.7  C) Temporal 92 18 100 %       Physical Exam  General: Sitting up in bed  Eyes:  The pupils are equal and round    Conjunctivae and sclerae are normal  ENT:    Wearing a mask  Neck:  Normal range of motion  CV:  Regular rate, regular rhythm     Skin warm and well perfused   Resp:  Non labored breathing on room air    No " tachypnea    No cough heard, lungs clear bilaterally  GI:  Abdomen is soft, there is no rigidity    No distension    No rebound tenderness     Mild epigastric tenderness  MS:  Mild bilateral hand tremors  Skin:  No rash or acute skin lesions noted  Neuro:   Awake, alert.      Speech is normal and fluent.    Face is symmetric.     Moves all extremities equally  Psych:  Appears mildly anxious    Emergency Department Course     Laboratory:  Labs Ordered and Resulted from Time of ED Arrival to Time of ED Departure   CBC WITH PLATELETS - Abnormal       Result Value    WBC Count 6.9      RBC Count 5.30 (*)     Hemoglobin 14.5      Hematocrit 44.5      MCV 84      MCH 27.4      MCHC 32.6      RDW 12.9      Platelet Count 319     COMPREHENSIVE METABOLIC PANEL - Normal    Sodium 138      Potassium 4.2      Chloride 100      Carbon Dioxide (CO2) 28      Anion Gap 10      Urea Nitrogen 12.4      Creatinine 0.85      Calcium 9.2      Glucose 93      Alkaline Phosphatase 47      AST 26      ALT 23      Protein Total 7.9      Albumin 5.0      Bilirubin Total 0.3      GFR Estimate >90     LIPASE - Normal    Lipase 27     ETHYL ALCOHOL LEVEL - Normal    Alcohol ethyl <0.01     HCG QUALITATIVE PREGNANCY - Normal    hCG Serum Qualitative Negative     INR - Normal    INR 0.94     MAGNESIUM - Normal    Magnesium 1.9     TYPE AND SCREEN, ADULT    ABO/RH(D) A POS      Antibody Screen Negative      SPECIMEN EXPIRATION DATE [id number]     ABO/RH TYPE AND SCREEN      Emergency Department Course:    Reviewed:  I reviewed nursing notes, vitals, past medical history and Care Everywhere    Assessments:  ED Course as of 12/24/22 0509   Sat Dec 24, 2022   0310 I obtained history and examined the patient.    7634 I rechecked the patient, who looks improved.      Interventions:  Medications   ondansetron (ZOFRAN) injection 4 mg (4 mg Intravenous Given 12/24/22 0243)   0.9% sodium chloride BOLUS (0 mLs Intravenous Stopped 12/24/22 0329)    pantoprazole (PROTONIX) IV push injection 40 mg (40 mg Intravenous Given 12/24/22 0313)   LORazepam (ATIVAN) injection 0.5 mg (0.5 mg Intravenous Given 12/24/22 0313)   LORazepam (ATIVAN) injection 0.5 mg (0.5 mg Intravenous Given 12/24/22 0320)   ondansetron (ZOFRAN) injection 4 mg (4 mg Intravenous Given 12/24/22 0518)     Disposition:  The patient was discharged home.     Impression & Plan     CMS Diagnoses: None    Medical Decision Making:  Monica Matos is a 27-year-old male who presents the emergency department with abdominal pain.  Also thought there was blood in the vomit tonight.  Suspect that has inflammation versus ulcer causing dark stools.  He is hemodynamically stable and labs are unremarkable.  He is not on any anticoagulation.  He is in mild withdrawal.  Required ativan for this. Looked much improved after medication. No further episodes of vomiting or stools in ED. given hemodynamically stable, normal hemoglobin and no further episodes in the emergency department, do think discharge home is reasonable at this time and he felt comfortable with this plan.  Discussed Librium use only if not drinking alcohol.  Has significant other in the emergency department who will make sure that he is not taking this with alcohol.  Recommended starting PPI, Carafate and Zofran as needed for nausea.  Has also been taking ibuprofen and discussed that should stop this.  Recommended GI follow-up as well as primary care follow-up.    Diagnosis:    ICD-10-CM    1. Abdominal pain, epigastric  R10.13       2. Alcohol withdrawal syndrome without complication (H)  F10.930              Scribe Disclosure:  I, VANESSA GROSSMAN, am serving as a scribe at 3:09 AM on 12/24/2022 to document services personally performed by Debbie Miller MD based on my observations and the provider's statements to me.          Debbie Miller MD  12/24/22 7028

## 2022-12-24 NOTE — DISCHARGE INSTRUCTIONS
Avoid ibuprofen and aspirin  Tylenol for pain is okay  Zofran for nausea as needed  Librium can be used for alcohol withdrawal symptoms but can't drink alcohol while taking this medication  Use the carafate and protonix for your stomach. Suspect that you have inflammation or ulcer in the stomach  Call gastroenterology to schedule appointment for possible endoscopy  If noticing continuing black stools for >1-2 weeks should follow-up with primary care provider for repeat hemoglobin though would not be surprised if you have black stools for a few days

## 2022-12-24 NOTE — ED NOTES
"Pt is shaking in chair. States he \"does this sometimes\" over the last couple months of heavy drinking.     CIWA 18. Teeth chattering, severe nausea/vomiting, sweating. Unable to obtain pulse oximetry due to severity of tremors and sweating.     MD notified, ativan given. Charge nurse notified.   "

## 2022-12-24 NOTE — ED TRIAGE NOTES
"Abd pain for 1 week. Vomiting started 2 hours PTA and pt states \"it looked like really dark, black blood\". Pt reports half of a 1.75L vodka intake every other day for 2 months. Last drink 12:00.      " 15-Dec-2022 07:28

## 2022-12-25 ASSESSMENT — ACTIVITIES OF DAILY LIVING (ADL): ADLS_ACUITY_SCORE: 35

## 2023-02-09 ENCOUNTER — HOSPITAL ENCOUNTER (EMERGENCY)
Facility: CLINIC | Age: 28
Discharge: HOME | End: 2023-02-09
Attending: EMERGENCY MEDICINE | Admitting: EMERGENCY MEDICINE
Payer: COMMERCIAL

## 2023-02-09 ENCOUNTER — APPOINTMENT (OUTPATIENT)
Dept: CT IMAGING | Facility: CLINIC | Age: 28
End: 2023-02-09
Attending: STUDENT IN AN ORGANIZED HEALTH CARE EDUCATION/TRAINING PROGRAM
Payer: COMMERCIAL

## 2023-02-09 VITALS
OXYGEN SATURATION: 99 % | HEART RATE: 55 BPM | DIASTOLIC BLOOD PRESSURE: 78 MMHG | SYSTOLIC BLOOD PRESSURE: 113 MMHG | RESPIRATION RATE: 19 BRPM

## 2023-02-09 DIAGNOSIS — F07.81 POST CONCUSSIVE SYNDROME: ICD-10-CM

## 2023-02-09 DIAGNOSIS — K46.9 ABDOMINAL HERNIA: ICD-10-CM

## 2023-02-09 DIAGNOSIS — S09.90XA INJURY OF HEAD, INITIAL ENCOUNTER: ICD-10-CM

## 2023-02-09 LAB
ANION GAP SERPL CALCULATED.3IONS-SCNC: 12 MMOL/L (ref 7–15)
BASOPHILS # BLD AUTO: 0 10E3/UL (ref 0–0.2)
BASOPHILS NFR BLD AUTO: 0 %
BUN SERPL-MCNC: 11.9 MG/DL (ref 6–20)
CALCIUM SERPL-MCNC: 9.3 MG/DL (ref 8.6–10)
CHLORIDE SERPL-SCNC: 102 MMOL/L (ref 98–107)
CREAT SERPL-MCNC: 0.91 MG/DL (ref 0.51–1.17)
DEPRECATED HCO3 PLAS-SCNC: 24 MMOL/L (ref 22–29)
EOSINOPHIL # BLD AUTO: 0 10E3/UL (ref 0–0.7)
EOSINOPHIL NFR BLD AUTO: 0 %
ERYTHROCYTE [DISTWIDTH] IN BLOOD BY AUTOMATED COUNT: 13.1 % (ref 10–15)
GFR SERPL CREATININE-BSD FRML MDRD: 88 ML/MIN/1.73M2
GLUCOSE SERPL-MCNC: 105 MG/DL (ref 70–99)
HCT VFR BLD AUTO: 45 % (ref 35–53)
HGB BLD-MCNC: 14.3 G/DL (ref 11.7–17.7)
HOLD SPECIMEN: NORMAL
HOLD SPECIMEN: NORMAL
IMM GRANULOCYTES # BLD: 0 10E3/UL
IMM GRANULOCYTES NFR BLD: 0 %
LYMPHOCYTES # BLD AUTO: 1.6 10E3/UL (ref 0.8–5.3)
LYMPHOCYTES NFR BLD AUTO: 18 %
MCH RBC QN AUTO: 27.8 PG (ref 26.5–33)
MCHC RBC AUTO-ENTMCNC: 31.8 G/DL (ref 31.5–36.5)
MCV RBC AUTO: 88 FL (ref 78–100)
MONOCYTES # BLD AUTO: 0.7 10E3/UL (ref 0–1.3)
MONOCYTES NFR BLD AUTO: 8 %
NEUTROPHILS # BLD AUTO: 6.7 10E3/UL (ref 1.6–8.3)
NEUTROPHILS NFR BLD AUTO: 74 %
NRBC # BLD AUTO: 0 10E3/UL
NRBC BLD AUTO-RTO: 0 /100
PLATELET # BLD AUTO: 284 10E3/UL (ref 150–450)
POTASSIUM SERPL-SCNC: 3.9 MMOL/L (ref 3.4–5.3)
RBC # BLD AUTO: 5.14 10E6/UL (ref 3.8–5.9)
SODIUM SERPL-SCNC: 138 MMOL/L (ref 136–145)
WBC # BLD AUTO: 9.1 10E3/UL (ref 4–11)

## 2023-02-09 PROCEDURE — 96361 HYDRATE IV INFUSION ADD-ON: CPT

## 2023-02-09 PROCEDURE — 96374 THER/PROPH/DIAG INJ IV PUSH: CPT | Mod: 59

## 2023-02-09 PROCEDURE — 250N000009 HC RX 250: Performed by: STUDENT IN AN ORGANIZED HEALTH CARE EDUCATION/TRAINING PROGRAM

## 2023-02-09 PROCEDURE — 258N000003 HC RX IP 258 OP 636: Performed by: STUDENT IN AN ORGANIZED HEALTH CARE EDUCATION/TRAINING PROGRAM

## 2023-02-09 PROCEDURE — 80048 BASIC METABOLIC PNL TOTAL CA: CPT | Performed by: EMERGENCY MEDICINE

## 2023-02-09 PROCEDURE — 85004 AUTOMATED DIFF WBC COUNT: CPT | Performed by: EMERGENCY MEDICINE

## 2023-02-09 PROCEDURE — 99285 EMERGENCY DEPT VISIT HI MDM: CPT | Mod: 25

## 2023-02-09 PROCEDURE — 36415 COLL VENOUS BLD VENIPUNCTURE: CPT | Performed by: EMERGENCY MEDICINE

## 2023-02-09 PROCEDURE — 250N000011 HC RX IP 250 OP 636: Performed by: STUDENT IN AN ORGANIZED HEALTH CARE EDUCATION/TRAINING PROGRAM

## 2023-02-09 PROCEDURE — 96375 TX/PRO/DX INJ NEW DRUG ADDON: CPT

## 2023-02-09 PROCEDURE — 85025 COMPLETE CBC W/AUTO DIFF WBC: CPT | Performed by: EMERGENCY MEDICINE

## 2023-02-09 PROCEDURE — 74177 CT ABD & PELVIS W/CONTRAST: CPT

## 2023-02-09 PROCEDURE — 70450 CT HEAD/BRAIN W/O DYE: CPT

## 2023-02-09 PROCEDURE — 250N000011 HC RX IP 250 OP 636: Performed by: EMERGENCY MEDICINE

## 2023-02-09 PROCEDURE — 250N000013 HC RX MED GY IP 250 OP 250 PS 637: Performed by: STUDENT IN AN ORGANIZED HEALTH CARE EDUCATION/TRAINING PROGRAM

## 2023-02-09 RX ORDER — POLYETHYLENE GLYCOL 3350 17 G/17G
1 POWDER, FOR SOLUTION ORAL DAILY
Qty: 289 G | Refills: 0 | Status: SHIPPED | OUTPATIENT
Start: 2023-02-09 | End: 2023-02-26

## 2023-02-09 RX ORDER — ONDANSETRON 4 MG/1
4 TABLET, ORALLY DISINTEGRATING ORAL ONCE
Status: COMPLETED | OUTPATIENT
Start: 2023-02-09 | End: 2023-02-09

## 2023-02-09 RX ORDER — METOCLOPRAMIDE HYDROCHLORIDE 5 MG/ML
10 INJECTION INTRAMUSCULAR; INTRAVENOUS ONCE
Status: COMPLETED | OUTPATIENT
Start: 2023-02-09 | End: 2023-02-09

## 2023-02-09 RX ORDER — IOPAMIDOL 755 MG/ML
500 INJECTION, SOLUTION INTRAVASCULAR ONCE
Status: COMPLETED | OUTPATIENT
Start: 2023-02-09 | End: 2023-02-09

## 2023-02-09 RX ORDER — ONDANSETRON 4 MG/1
4 TABLET, ORALLY DISINTEGRATING ORAL EVERY 8 HOURS PRN
Qty: 10 TABLET | Refills: 0 | Status: SHIPPED | OUTPATIENT
Start: 2023-02-09 | End: 2023-12-23

## 2023-02-09 RX ORDER — DIPHENHYDRAMINE HYDROCHLORIDE 50 MG/ML
25 INJECTION INTRAMUSCULAR; INTRAVENOUS ONCE
Status: COMPLETED | OUTPATIENT
Start: 2023-02-09 | End: 2023-02-09

## 2023-02-09 RX ORDER — ACETAMINOPHEN 500 MG
1000 TABLET ORAL ONCE
Status: COMPLETED | OUTPATIENT
Start: 2023-02-09 | End: 2023-02-09

## 2023-02-09 RX ADMIN — ONDANSETRON 4 MG: 4 TABLET, ORALLY DISINTEGRATING ORAL at 09:30

## 2023-02-09 RX ADMIN — METOCLOPRAMIDE HYDROCHLORIDE 10 MG: 5 INJECTION INTRAMUSCULAR; INTRAVENOUS at 12:23

## 2023-02-09 RX ADMIN — SODIUM CHLORIDE 58 ML: 9 INJECTION, SOLUTION INTRAVENOUS at 12:48

## 2023-02-09 RX ADMIN — ACETAMINOPHEN 1000 MG: 500 TABLET ORAL at 12:22

## 2023-02-09 RX ADMIN — SODIUM CHLORIDE 1000 ML: 9 INJECTION, SOLUTION INTRAVENOUS at 12:20

## 2023-02-09 RX ADMIN — DIPHENHYDRAMINE HYDROCHLORIDE 25 MG: 50 INJECTION, SOLUTION INTRAMUSCULAR; INTRAVENOUS at 12:23

## 2023-02-09 RX ADMIN — IOPAMIDOL 73 ML: 755 INJECTION, SOLUTION INTRAVENOUS at 12:48

## 2023-02-09 ASSESSMENT — ENCOUNTER SYMPTOMS
DIARRHEA: 0
HEADACHES: 1
VOMITING: 0
NAUSEA: 1
CONFUSION: 0
CONSTIPATION: 1

## 2023-02-09 ASSESSMENT — ACTIVITIES OF DAILY LIVING (ADL)
ADLS_ACUITY_SCORE: 35
ADLS_ACUITY_SCORE: 35

## 2023-02-09 NOTE — Clinical Note
Monica Matos was seen and treated in our emergency department on 2/9/2023.  He may return to work on 02/10/2023.  Patient may require breaks from computer for headaches once back at work. Please allow him to step away for roughly 15-20 minute intervals at a time.      If you have any questions or concerns, please don't hesitate to call.      Tish Brady, [place]

## 2023-02-09 NOTE — ED PROVIDER NOTES
Emergency Department Attending Supervision Note  2/9/2023  4:38 PM      I evaluated this patient in conjunction with an Advanced Practice Clinician:    APC: Tish Brady PA-C    Briefly, the patient presented with 2 recent falls.  One included trauma to the right frontal scalp near the eyebrow and a second that occurred with direct trauma to the occipital parietal.  The patient has had ongoing symptoms of headache, slow processing, nausea, and was concerned about intracranial injury.  In addition the patient also notes a history of umbilical hernia that has become more problematic with standing and with Valsalva mainly while attempting to stool.  The patient's been mildly constipated.  The hernia is reducible.      Examination:   General: Resting comfortably on the gurney  Head:  The scalp, face, and head appear normal  Eyes:  The pupils are normal    Conjunctivae and sclera appear normal  ENT:    The nose is normal    Ears/pinnae are normal  Lungs: Clear  GI:  Nontender, nonfocal, and benign    The umbilical hernia is not currently out.  It is fully reduced in the supine position.  There is no evidence of incarceration or strangulation in the area.  CV:  Normal  Neck:  Normal range of motion  MS:  Normal  Skin:  No rash or lesions noted.  Neuro: Speech is normal and fluent    GCS 15  Psych:  Awake. Alert.  Normal affect.      Appropriate interactions        Medical decision making:  This transgender patient (born biologically female, with gender identity as a male), on hormones, presents to the emergency department with 2 recent head injuries.  In addition he notes that he has been having more symptoms from his umbilical hernia.  Clinical examination is entirely normal.  Symptoms are consistent with a postconcussive syndrome.  There is no acute emergency related to the umbilical hernia that is not currently out.  There is no evidence of incarceration or strangulation.  The umbilical hernia can be fixed as an  outpatient on an elective basis.  The patient should try some over-the-counter MiraLAX for constipation and may use Tylenol and Zofran for symptom control for the nausea.    My impression/diagnosis is:  Concussion with postconcussive syndrome  Reducible umbilical hernia        Dami Greenwood MD, [facility], [facility]           Dami Greenwood MD  02/09/23 9049

## 2023-02-09 NOTE — Clinical Note
Monica Matos was seen and treated in our emergency department on 2/9/2023.  He may return to work on 02/13/2023.  Once patient returns to work, patient may require breaks from      If you have any questions or concerns, please don't hesitate to call.      Tish Brady PA-C

## 2023-02-09 NOTE — ED TRIAGE NOTES
Pt arrives ambulatory for hernia (has had for 5 years) that is worsening and pt states having to push it in every 30 minutes. Reports edilson blood in stool and urgency to go but inability to pass adequate stool. Nauseous and vomiting. Fell two weeks ago and again one week ago and is also concerned for concussion. Having headache, dizziness and decreased appetite. Pt also complains of slurred speech and confusion.

## 2023-02-09 NOTE — ED NOTES
Rapid Assessment Note    History:   Monica Matos is a 27 year old adult who presents with nausea, decreased appetite, and hematochezia. Patient has a known hernia since 5 years ago. This has been very prominent for the last 1 month and he reports having to push the hernia into place since. The last few days, he has had increased nausea and has not been able to eat due to this. He endorses abdominal pain with this. Patient also reports concern of a concussion. He reports he has had 2 mechanical falls in the last 1.5 weeks where he has hit his forehead and posterior head. Last fall 1 week ago. He states he did possibly lose consciousness briefly with the initial fall. Since then, he has been confused, dizzy, photosensitive, and has had severe headaches. He states his girlfriend also noted him to have slurred speech yesterday. This has resolved. Today, his headache has been worsening and is currently 7/10. He has history of migraines but states this is not consistent with a migraine. He has not had relief with Tylenol or ibuprofen. He denies personal history of GI disease but notes his father does have history of Crohn's disease. He denies paraesthesia, weakness, and vomiting.       Exam:   General:  Alert, interactive  Cardiovascular:  Well perfused  Lungs:  No respiratory distress, no accessory muscle use  Neuro:  Moving all 4 extremities. Equal  strength.  Skin:  Warm, dry  Abdominal: flat with no scars. 1 cm umbilical hernia palpated. No tenderness or rebound.  Psych:  Normal affect      Plan of Care:   I evaluated the patient and developed an initial plan of care. I discussed this plan and explained that I, or one of my partners, would be returning to complete the evaluation.       I, Bisi Harper, am serving as a scribe to document services personally performed by Afshan Morin PA-C, based on my observations and the provider's statements to me.    2/9/2023  [employer]    Portions of this medical record were completed by a scribe. UPON MY REVIEW AND AUTHENTICATION BY ELECTRONIC SIGNATURE, this confirms (a) I performed the applicable clinical services, and (b) the record is accurate.        Afshan Morin PA-C  02/09/23 3007

## 2023-02-09 NOTE — ED PROVIDER NOTES
History     Chief Complaint:  Dizziness       HPI   Monica Matos is a 27 year old male on testosterone treatment for gender transition with a history of umbilical hernia who presents with multiple concerns. Patient reports hitting his right forehead on dresser when getting out of bed two weeks ago. One week later, patient hit back of head when putting up window curtains when he got dizzy and fell backwards. Patient denies LOC, emesis, confusion. Endorses headaches from time to time with associated nausea. Tried ibuprofen without relief. Patient received zofran and tylenol in triage with symptomatic relief.    Patient also has umbilical hernia that has been stable and able to reduce for past 5 years. In past couple of months, patient notes increased abdominal pain with more frequent requirement to reduce hernia. Notes that it becomes more present when straining abdomen in eating, exercise, and BMs. Has been having some constipation in past week, noting bright red blood in toilet water with BMs. Patient reports little fluid intake. Denies black stools or diarrhea.      Independent Historian:   None     Review of External Notes: None     ROS:  Review of Systems   Eyes: Negative for visual disturbance.   Gastrointestinal: Positive for constipation and nausea. Negative for diarrhea and vomiting.   Neurological: Positive for headaches. Negative for syncope.   Psychiatric/Behavioral: Negative for confusion.       Allergies:  No Known Allergies     Medications:    ondansetron (ZOFRAN ODT) 4 MG ODT tab  polyethylene glycol (MIRALAX) 17 GM/Dose powder  albuterol (PROAIR HFA/PROVENTIL HFA/VENTOLIN HFA) 108 (90 Base) MCG/ACT inhaler  chlordiazePOXIDE (LIBRIUM) 25 MG capsule  Omega-3 Fatty Acids (FISH OIL) 1200 MG capsule  omeprazole (PRILOSEC) 20 MG DR capsule  testosterone cypionate (DEPOTESTOSTERONE) 100 MG/ML injection      Past Medical History:    No past medical history on file.    Past Surgical History:    No  past surgical history on file.     Family History:    family history is not on file.    Social History:   reports that he has never smoked. He has never used smokeless tobacco.  PCP: No Ref-Primary, Physician     Physical Exam     Patient Vitals for the past 24 hrs:   BP Pulse Resp SpO2   02/09/23 1230 -- -- 19 --   02/09/23 1223 -- -- -- 99 %   02/09/23 1222 -- -- -- 99 %   02/09/23 1220 113/78 55 -- --        Physical Exam  Pulmonary:      Effort: Pulmonary effort is normal.   Abdominal:      General: Bowel sounds are normal.      Palpations: Abdomen is soft.      Tenderness: There is no abdominal tenderness. There is no guarding or rebound.      Hernia: A hernia (not protruding) is present.   Musculoskeletal:         General: Normal range of motion.   Skin:     General: Skin is warm.   Neurological:      Mental Status: He is alert and oriented to person, place, and time.         Emergency Department Course     Imaging:  CT Head w/o Contrast   Final Result   IMPRESSION:   No intracranial hemorrhage, mass, or definite CT evidence of recent   ischemia.      LISA SANCHEZ MD            SYSTEM ID:  [id number]      CT Abdomen Pelvis w Contrast   Final Result   IMPRESSION:    1.  No acute abnormality in the abdomen or pelvis.      GRACE POTTS MD            SYSTEM ID:  [id number]        Report per radiology    Laboratory:  Labs Ordered and Resulted from Time of ED Arrival to Time of ED Departure   BASIC METABOLIC PANEL - Abnormal       Result Value    Sodium 138      Potassium 3.9      Chloride 102      Carbon Dioxide (CO2) 24      Anion Gap 12      Urea Nitrogen 11.9      Creatinine 0.91      Calcium 9.3      Glucose 105 (*)     GFR Estimate 88     CBC WITH PLATELETS AND DIFFERENTIAL    WBC Count 9.1      RBC Count 5.14      Hemoglobin 14.3      Hematocrit 45.0      MCV 88      MCH 27.8      MCHC 31.8      RDW 13.1      Platelet Count 284      % Neutrophils 74      % Lymphocytes 18      % Monocytes 8      %  Eosinophils 0      % Basophils 0      % Immature Granulocytes 0      NRBCs per 100 WBC 0      Absolute Neutrophils 6.7      Absolute Lymphocytes 1.6      Absolute Monocytes 0.7      Absolute Eosinophils 0.0      Absolute Basophils 0.0      Absolute Immature Granulocytes 0.0      Absolute NRBCs 0.0          Procedures   None    Emergency Department Course & Assessments:    Interventions:  Medications   ondansetron (ZOFRAN ODT) ODT tab 4 mg (4 mg Oral Given 2/9/23 0930)   metoclopramide (REGLAN) injection 10 mg (10 mg Intravenous Given 2/9/23 1223)   diphenhydrAMINE (BENADRYL) injection 25 mg (25 mg Intravenous Given 2/9/23 1223)   acetaminophen (TYLENOL) tablet 1,000 mg (1,000 mg Oral Given 2/9/23 1222)   0.9% sodium chloride BOLUS (0 mLs Intravenous Stopped 2/9/23 1640)   iopamidol (ISOVUE-370) solution 500 mL (73 mLs Intravenous Given 2/9/23 1248)   CT scan flush (58 mLs Intravenous Given 2/9/23 1248)        Independent Interpretation (X-rays, CTs, rhythm strip):  None     Consultations/Discussion of Management or Tests:  None        Social Determinants of Health affecting care:  None     Disposition:  The patient was discharged to home.     Impression & Plan      Medical Decision Making:  Stef Matos is a 27 year old adult who presents with multiple concerns including his umbilical hernia and unrelated head injury. Patient is likely experiencing post-concussive syndrome with ongoing headaches and nausea but decided to rule out intracranial concerns with head imaging. CT head without bleed or fracture. Patient understands and reassured. Patient's hernia is not noticeably present on exam and when his umbilical region is firmly pushed, patient does not experience worsening abdominal pain. Low concern for strangulated or incarcerated hernia at this time and discussed option of elective surgical repair as outpatient. Patient has been hesitant to do so in past but states he will consider this moving forward. As  for patient's constipation, patient instructed to increase fluid intake and has been provided with prescription for miralax.      Diagnosis:    ICD-10-CM    1. Abdominal hernia  K46.9       2. Injury of head, initial encounter  S09.90XA       3. Post concussive syndrome  F07.81            Discharge Medications:  Discharge Medication List as of 2/9/2023  4:40 PM      START taking these medications    Details   polyethylene glycol (MIRALAX) 17 GM/Dose powder Take 17 g (1 capful) by mouth daily for 17 days, Disp-289 g, R-0, E-Prescribe              2/9/2023   Dami Greenwood MD Lauren R. Snell, PA-C           Tish Brady PAPrabhjotC  02/09/23 4313

## 2023-06-12 ENCOUNTER — APPOINTMENT (OUTPATIENT)
Dept: GENERAL RADIOLOGY | Facility: CLINIC | Age: 28
End: 2023-06-12
Attending: EMERGENCY MEDICINE
Payer: MEDICAID

## 2023-06-12 ENCOUNTER — HOSPITAL ENCOUNTER (EMERGENCY)
Facility: CLINIC | Age: 28
Discharge: HOME | End: 2023-06-12
Attending: EMERGENCY MEDICINE | Admitting: EMERGENCY MEDICINE
Payer: MEDICAID

## 2023-06-12 VITALS
RESPIRATION RATE: 18 BRPM | OXYGEN SATURATION: 97 % | DIASTOLIC BLOOD PRESSURE: 94 MMHG | SYSTOLIC BLOOD PRESSURE: 134 MMHG | TEMPERATURE: 98.5 F | HEART RATE: 73 BPM

## 2023-06-12 DIAGNOSIS — R10.13 EPIGASTRIC PAIN: ICD-10-CM

## 2023-06-12 DIAGNOSIS — K42.9 UMBILICAL HERNIA WITHOUT OBSTRUCTION AND WITHOUT GANGRENE: ICD-10-CM

## 2023-06-12 DIAGNOSIS — J20.9 ACUTE BRONCHITIS WITH SYMPTOMS > 10 DAYS: ICD-10-CM

## 2023-06-12 LAB
ALBUMIN SERPL BCG-MCNC: 4.5 G/DL (ref 3.5–5.2)
ALP SERPL-CCNC: 47 U/L (ref 35–129)
ALT SERPL W P-5'-P-CCNC: 20 U/L (ref 10–50)
ANION GAP SERPL CALCULATED.3IONS-SCNC: 11 MMOL/L (ref 7–15)
AST SERPL W P-5'-P-CCNC: 32 U/L (ref 10–50)
BASOPHILS # BLD AUTO: 0 10E3/UL (ref 0–0.2)
BASOPHILS NFR BLD AUTO: 0 %
BILIRUB SERPL-MCNC: 0.5 MG/DL
BUN SERPL-MCNC: 12.2 MG/DL (ref 6–20)
CALCIUM SERPL-MCNC: 8.9 MG/DL (ref 8.6–10)
CHLORIDE SERPL-SCNC: 100 MMOL/L (ref 98–107)
CREAT SERPL-MCNC: 0.83 MG/DL (ref 0.51–1.17)
DEPRECATED HCO3 PLAS-SCNC: 25 MMOL/L (ref 22–29)
EOSINOPHIL # BLD AUTO: 0 10E3/UL (ref 0–0.7)
EOSINOPHIL NFR BLD AUTO: 0 %
ERYTHROCYTE [DISTWIDTH] IN BLOOD BY AUTOMATED COUNT: 13.5 % (ref 10–15)
FLUAV RNA SPEC QL NAA+PROBE: NEGATIVE
FLUBV RNA RESP QL NAA+PROBE: NEGATIVE
GFR SERPL CREATININE-BSD FRML MDRD: >90 ML/MIN/1.73M2
GLUCOSE SERPL-MCNC: 79 MG/DL (ref 70–99)
HCT VFR BLD AUTO: 44.2 % (ref 35–53)
HGB BLD-MCNC: 14.2 G/DL (ref 11.7–17.7)
IMM GRANULOCYTES # BLD: 0 10E3/UL
IMM GRANULOCYTES NFR BLD: 0 %
LIPASE SERPL-CCNC: 20 U/L (ref 13–60)
LYMPHOCYTES # BLD AUTO: 1.3 10E3/UL (ref 0.8–5.3)
LYMPHOCYTES NFR BLD AUTO: 19 %
MCH RBC QN AUTO: 27 PG (ref 26.5–33)
MCHC RBC AUTO-ENTMCNC: 32.1 G/DL (ref 31.5–36.5)
MCV RBC AUTO: 84 FL (ref 78–100)
MONOCYTES # BLD AUTO: 0.5 10E3/UL (ref 0–1.3)
MONOCYTES NFR BLD AUTO: 7 %
NEUTROPHILS # BLD AUTO: 4.8 10E3/UL (ref 1.6–8.3)
NEUTROPHILS NFR BLD AUTO: 74 %
NRBC # BLD AUTO: 0 10E3/UL
NRBC BLD AUTO-RTO: 0 /100
PLATELET # BLD AUTO: 278 10E3/UL (ref 150–450)
POTASSIUM SERPL-SCNC: 4.3 MMOL/L (ref 3.4–5.3)
PROT SERPL-MCNC: 7.4 G/DL (ref 6.4–8.3)
RBC # BLD AUTO: 5.25 10E6/UL (ref 3.8–5.9)
RSV RNA SPEC NAA+PROBE: NEGATIVE
SARS-COV-2 RNA RESP QL NAA+PROBE: NEGATIVE
SODIUM SERPL-SCNC: 136 MMOL/L (ref 136–145)
WBC # BLD AUTO: 6.6 10E3/UL (ref 4–11)

## 2023-06-12 PROCEDURE — 99284 EMERGENCY DEPT VISIT MOD MDM: CPT | Mod: 25

## 2023-06-12 PROCEDURE — 85025 COMPLETE CBC W/AUTO DIFF WBC: CPT | Performed by: EMERGENCY MEDICINE

## 2023-06-12 PROCEDURE — 80053 COMPREHEN METABOLIC PANEL: CPT | Performed by: EMERGENCY MEDICINE

## 2023-06-12 PROCEDURE — 87637 SARSCOV2&INF A&B&RSV AMP PRB: CPT | Performed by: EMERGENCY MEDICINE

## 2023-06-12 PROCEDURE — 71046 X-RAY EXAM CHEST 2 VIEWS: CPT

## 2023-06-12 PROCEDURE — 36415 COLL VENOUS BLD VENIPUNCTURE: CPT | Performed by: EMERGENCY MEDICINE

## 2023-06-12 PROCEDURE — 83690 ASSAY OF LIPASE: CPT | Performed by: EMERGENCY MEDICINE

## 2023-06-12 RX ORDER — AZITHROMYCIN 250 MG/1
TABLET, FILM COATED ORAL
Qty: 6 TABLET | Refills: 0 | Status: SHIPPED | OUTPATIENT
Start: 2023-06-12 | End: 2023-06-17

## 2023-06-12 NOTE — ED TRIAGE NOTES
Pt here for a couple of days of URI symptoms. Also endorses n/v and abdominal pain. Would like a covid test. Has not done a home test.

## 2023-06-12 NOTE — ED PROVIDER NOTES
History     Chief Complaint:  Cough       The history is provided by the patient.      Monica Matos is a 28 year old adult who presents with a productive cough, shortness of breath, nausea, constant abdominal pain, and cold sweats since 2 weeks ago. He does not have a history of asthma or pneumonia. He currently has an umbilical hernia, and it could be exacerbating his abdominal pain.     Independent Historian:   None - Patient Only    Review of External Notes:   Reviewed 5/2/22 office visit    Medications:    Albuterol   Librium   Omeprazole   Zofran   Depotestosterone     Past Medical History:    MDD   Borderline Personality Disorder   Anxiety     Past Surgical History:    The patient denies pertinent past surgical history.     Physical Exam     Patient Vitals for the past 24 hrs:   BP Temp Temp src Pulse Resp SpO2   06/12/23 1106 (!) 134/94 98.5  F (36.9  C) Temporal 73 18 97 %        Physical Exam  Constitutional: Alert, attentive  HENT:    Nose: Nose normal.    Mouth/Throat: Oropharynx is clear, mucous membranes are moist   Eyes: EOM are normal.   CV: regular rate and rhythm; no murmurs, rubs or gallups  Chest: Effort normal and breath sounds normal.   GI:  There is a small and easily reducible umbilical hernia without other tenderness. No distension. Normal bowel sounds  MSK: Normal range of motion.   Neurological: Alert, attentive  Skin: Skin is warm and dry.      Emergency Department Course   Imaging:  XR Chest 2 Views  Final Result  IMPRESSION: PA and lateral views of the chest were obtained.  Cardiomediastinal silhouette is within normal limits. No suspicious  focal pulmonary opacities. No significant pleural effusion or  pneumothorax.  DYLAN KATZ MD       Report per radiology    Laboratory:  Labs Ordered and Resulted from Time of ED Arrival to Time of ED Departure   INFLUENZA A/B, RSV, & SARS-COV2 PCR - Normal       Result Value    Influenza A PCR Negative      Influenza B PCR Negative       RSV PCR Negative      SARS CoV2 PCR Negative     COMPREHENSIVE METABOLIC PANEL - Normal    Sodium 136      Potassium 4.3      Chloride 100      Carbon Dioxide (CO2) 25      Anion Gap 11      Urea Nitrogen 12.2      Creatinine 0.83      Calcium 8.9      Glucose 79      Alkaline Phosphatase 47      AST 32      ALT 20      Protein Total 7.4      Albumin 4.5      Bilirubin Total 0.5      GFR Estimate >90     LIPASE - Normal    Lipase 20     CBC WITH PLATELETS AND DIFFERENTIAL    WBC Count 6.6      RBC Count 5.25      Hemoglobin 14.2      Hematocrit 44.2      MCV 84      MCH 27.0      MCHC 32.1      RDW 13.5      Platelet Count 278      % Neutrophils 74      % Lymphocytes 19      % Monocytes 7      % Eosinophils 0      % Basophils 0      % Immature Granulocytes 0      NRBCs per 100 WBC 0      Absolute Neutrophils 4.8      Absolute Lymphocytes 1.3      Absolute Monocytes 0.5      Absolute Eosinophils 0.0      Absolute Basophils 0.0      Absolute Immature Granulocytes 0.0      Absolute NRBCs 0.0        Emergency Department Course & Assessments:    Interventions:  None     Assessments:  1126 I obtained history and examined the patient as noted above.  1241 I rechecked the patient and explained findings. I discussed plan for discharge home.    Independent Interpretation (X-rays, CTs, rhythm strip):  No consolidation on chest x-ray    Consultations/Discussion of Management or Tests:  None      Social Determinants of Health affecting care:   None    Disposition:  The patient was discharged to home.     Impression & Plan      Medical Decision Making:  This a pleasant 28-year-old patient who presents for evaluation of productive cough for symptoms greater than 10 days.  Differential includes possible pneumonia, pneumothorax, COVID, among others.  Viral panel including COVID-19, RSV, and influenza is negative.  Chest x-ray shows no consolidation.  The patient has a clear infectious prodrome, not consistent with PE.  Given  productive cough, and concern for possible whooping cough/pertussis versus bacterial etiology bronchitis, plan azithromycin therapy.  No bronchospasm on exam.  Finally, the patient notes vague epigastric discomfort related to possibly a known umbilical hernia.  Abdominal exam is nontender and benign, screening labs show no concerning abnormality to indicate advanced imaging, and umbilical hernia is easily reduced.  Plan outpatient surgical follow-up for the same.  Plan primary care follow-up for recheck in 3 to 5 days return precaution for difficulty breathing, fever, abdominal pain, vomiting, or any other concerns.    Diagnosis:    ICD-10-CM    1. Acute bronchitis with symptoms > 10 days  J20.9       2. Epigastric pain  R10.13       3. Umbilical hernia without obstruction and without gangrene  K42.9            Discharge Medications:  Discharge Medication List as of 6/12/2023 12:48 PM      START taking these medications    Details   azithromycin (ZITHROMAX Z-CHAKA) 250 MG tablet Two tablets on the first day, then one tablet daily for the next 4 days, Disp-6 tablet, R-0, E-Prescribe              Scribe Disclosure:  I, Nirmal Madrid, am serving as a scribe at 11:51 AM on 6/12/2023 to document services personally performed by Carlos Quezada MD based on my observations and the provider's statements to me.   6/12/2023   Carlos Quezada MD Houghland, John Eric, MD  06/12/23 1401

## 2023-09-24 ENCOUNTER — APPOINTMENT (OUTPATIENT)
Dept: GENERAL RADIOLOGY | Facility: CLINIC | Age: 28
End: 2023-09-24
Attending: EMERGENCY MEDICINE
Payer: COMMERCIAL

## 2023-09-24 ENCOUNTER — HOSPITAL ENCOUNTER (EMERGENCY)
Facility: CLINIC | Age: 28
Discharge: HOME | End: 2023-09-24
Attending: EMERGENCY MEDICINE | Admitting: EMERGENCY MEDICINE
Payer: COMMERCIAL

## 2023-09-24 VITALS
HEIGHT: 67 IN | DIASTOLIC BLOOD PRESSURE: 78 MMHG | RESPIRATION RATE: 19 BRPM | SYSTOLIC BLOOD PRESSURE: 130 MMHG | OXYGEN SATURATION: 100 % | TEMPERATURE: 98.3 F | HEART RATE: 89 BPM | BODY MASS INDEX: 24.78 KG/M2 | WEIGHT: 157.85 LBS

## 2023-09-24 DIAGNOSIS — R05.9 COUGH, UNSPECIFIED TYPE: ICD-10-CM

## 2023-09-24 DIAGNOSIS — R11.2 NAUSEA AND VOMITING, UNSPECIFIED VOMITING TYPE: ICD-10-CM

## 2023-09-24 LAB
ALBUMIN SERPL BCG-MCNC: 4.9 G/DL (ref 3.5–5.2)
ALBUMIN UR-MCNC: 30 MG/DL
ALP SERPL-CCNC: 53 U/L (ref 35–129)
ALT SERPL W P-5'-P-CCNC: 31 U/L (ref 0–70)
ANION GAP SERPL CALCULATED.3IONS-SCNC: 14 MMOL/L (ref 7–15)
APPEARANCE UR: CLEAR
AST SERPL W P-5'-P-CCNC: 35 U/L (ref 0–45)
BASOPHILS # BLD MANUAL: 0.1 10E3/UL (ref 0–0.2)
BASOPHILS NFR BLD MANUAL: 1 %
BILIRUB DIRECT SERPL-MCNC: <0.2 MG/DL (ref 0–0.3)
BILIRUB SERPL-MCNC: 0.7 MG/DL
BILIRUB UR QL STRIP: NEGATIVE
BUN SERPL-MCNC: 10.5 MG/DL (ref 6–20)
CALCIUM SERPL-MCNC: 9.7 MG/DL (ref 8.6–10)
CHLORIDE SERPL-SCNC: 100 MMOL/L (ref 98–107)
COLOR UR AUTO: YELLOW
CREAT SERPL-MCNC: 0.87 MG/DL (ref 0.51–1.17)
DEPRECATED HCO3 PLAS-SCNC: 24 MMOL/L (ref 22–29)
EGFRCR SERPLBLD CKD-EPI 2021: >90 ML/MIN/1.73M2
EOSINOPHIL # BLD MANUAL: 0 10E3/UL (ref 0–0.7)
EOSINOPHIL NFR BLD MANUAL: 0 %
ERYTHROCYTE [DISTWIDTH] IN BLOOD BY AUTOMATED COUNT: 13 % (ref 10–15)
FLUAV RNA SPEC QL NAA+PROBE: NEGATIVE
FLUBV RNA RESP QL NAA+PROBE: NEGATIVE
GLUCOSE SERPL-MCNC: 94 MG/DL (ref 70–99)
GLUCOSE UR STRIP-MCNC: NEGATIVE MG/DL
HCG UR QL: NEGATIVE
HCT VFR BLD AUTO: 49.2 % (ref 35–53)
HGB BLD-MCNC: 16 G/DL (ref 11.7–17.7)
HGB UR QL STRIP: NEGATIVE
HOLD SPECIMEN: NORMAL
HOLD SPECIMEN: NORMAL
KETONES UR STRIP-MCNC: NEGATIVE MG/DL
LEUKOCYTE ESTERASE UR QL STRIP: NEGATIVE
LIPASE SERPL-CCNC: 47 U/L (ref 13–60)
LYMPHOCYTES # BLD MANUAL: 2.4 10E3/UL (ref 0.8–5.3)
LYMPHOCYTES NFR BLD MANUAL: 23 %
MCH RBC QN AUTO: 27 PG (ref 26.5–33)
MCHC RBC AUTO-ENTMCNC: 32.5 G/DL (ref 31.5–36.5)
MCV RBC AUTO: 83 FL (ref 78–100)
MONOCYTES # BLD MANUAL: 0.5 10E3/UL (ref 0–1.3)
MONOCYTES NFR BLD MANUAL: 5 %
MUCOUS THREADS #/AREA URNS LPF: PRESENT /LPF
NEUTROPHILS # BLD MANUAL: 7.3 10E3/UL (ref 1.6–8.3)
NEUTROPHILS NFR BLD MANUAL: 71 %
NITRATE UR QL: NEGATIVE
PH UR STRIP: 7.5 [PH] (ref 5–7)
PLAT MORPH BLD: NORMAL
PLATELET # BLD AUTO: 326 10E3/UL (ref 150–450)
POTASSIUM SERPL-SCNC: 4.1 MMOL/L (ref 3.4–5.3)
PROT SERPL-MCNC: 7.9 G/DL (ref 6.4–8.3)
RBC # BLD AUTO: 5.93 10E6/UL (ref 3.8–5.9)
RBC MORPH BLD: NORMAL
RBC URINE: 1 /HPF
RSV RNA SPEC NAA+PROBE: NEGATIVE
SARS-COV-2 RNA RESP QL NAA+PROBE: NEGATIVE
SODIUM SERPL-SCNC: 138 MMOL/L (ref 136–145)
SP GR UR STRIP: 1.03 (ref 1–1.03)
SQUAMOUS EPITHELIAL: 2 /HPF
TROPONIN T SERPL HS-MCNC: <6 NG/L
UROBILINOGEN UR STRIP-MCNC: NORMAL MG/DL
WBC # BLD AUTO: 10.3 10E3/UL (ref 4–11)
WBC URINE: 1 /HPF

## 2023-09-24 PROCEDURE — 258N000003 HC RX IP 258 OP 636: Performed by: EMERGENCY MEDICINE

## 2023-09-24 PROCEDURE — 81001 URINALYSIS AUTO W/SCOPE: CPT | Performed by: EMERGENCY MEDICINE

## 2023-09-24 PROCEDURE — 87637 SARSCOV2&INF A&B&RSV AMP PRB: CPT | Performed by: EMERGENCY MEDICINE

## 2023-09-24 PROCEDURE — 96374 THER/PROPH/DIAG INJ IV PUSH: CPT

## 2023-09-24 PROCEDURE — 83690 ASSAY OF LIPASE: CPT | Performed by: EMERGENCY MEDICINE

## 2023-09-24 PROCEDURE — 99285 EMERGENCY DEPT VISIT HI MDM: CPT | Mod: 25

## 2023-09-24 PROCEDURE — 85027 COMPLETE CBC AUTOMATED: CPT | Performed by: EMERGENCY MEDICINE

## 2023-09-24 PROCEDURE — 82248 BILIRUBIN DIRECT: CPT | Performed by: EMERGENCY MEDICINE

## 2023-09-24 PROCEDURE — 84484 ASSAY OF TROPONIN QUANT: CPT | Performed by: EMERGENCY MEDICINE

## 2023-09-24 PROCEDURE — 71046 X-RAY EXAM CHEST 2 VIEWS: CPT

## 2023-09-24 PROCEDURE — 85007 BL SMEAR W/DIFF WBC COUNT: CPT | Performed by: EMERGENCY MEDICINE

## 2023-09-24 PROCEDURE — 93005 ELECTROCARDIOGRAM TRACING: CPT

## 2023-09-24 PROCEDURE — 80053 COMPREHEN METABOLIC PANEL: CPT | Performed by: STUDENT IN AN ORGANIZED HEALTH CARE EDUCATION/TRAINING PROGRAM

## 2023-09-24 PROCEDURE — 80048 BASIC METABOLIC PNL TOTAL CA: CPT | Performed by: EMERGENCY MEDICINE

## 2023-09-24 PROCEDURE — 85007 BL SMEAR W/DIFF WBC COUNT: CPT | Performed by: STUDENT IN AN ORGANIZED HEALTH CARE EDUCATION/TRAINING PROGRAM

## 2023-09-24 PROCEDURE — 87637 SARSCOV2&INF A&B&RSV AMP PRB: CPT | Performed by: STUDENT IN AN ORGANIZED HEALTH CARE EDUCATION/TRAINING PROGRAM

## 2023-09-24 PROCEDURE — 96361 HYDRATE IV INFUSION ADD-ON: CPT

## 2023-09-24 PROCEDURE — 36415 COLL VENOUS BLD VENIPUNCTURE: CPT | Performed by: STUDENT IN AN ORGANIZED HEALTH CARE EDUCATION/TRAINING PROGRAM

## 2023-09-24 PROCEDURE — 250N000011 HC RX IP 250 OP 636: Mod: JZ | Performed by: EMERGENCY MEDICINE

## 2023-09-24 PROCEDURE — 85027 COMPLETE CBC AUTOMATED: CPT | Performed by: STUDENT IN AN ORGANIZED HEALTH CARE EDUCATION/TRAINING PROGRAM

## 2023-09-24 PROCEDURE — 81025 URINE PREGNANCY TEST: CPT | Performed by: EMERGENCY MEDICINE

## 2023-09-24 RX ORDER — ONDANSETRON 4 MG/1
4 TABLET, ORALLY DISINTEGRATING ORAL EVERY 6 HOURS PRN
Qty: 10 TABLET | Refills: 0 | Status: SHIPPED | OUTPATIENT
Start: 2023-09-24 | End: 2023-09-27

## 2023-09-24 RX ORDER — ALBUTEROL SULFATE 90 UG/1
2 AEROSOL, METERED RESPIRATORY (INHALATION)
Qty: 6.7 G | Refills: 0 | Status: SHIPPED | OUTPATIENT
Start: 2023-09-24

## 2023-09-24 RX ADMIN — SODIUM CHLORIDE 1000 ML: 9 INJECTION, SOLUTION INTRAVENOUS at 17:55

## 2023-09-24 RX ADMIN — FAMOTIDINE 20 MG: 10 INJECTION, SOLUTION INTRAVENOUS at 18:00

## 2023-09-24 ASSESSMENT — ACTIVITIES OF DAILY LIVING (ADL): ADLS_ACUITY_SCORE: 33

## 2023-09-24 NOTE — ED PROVIDER NOTES
"  History     Chief Complaint:  Cough, Nausea & Vomiting, Shortness of Breath, and Chest Pain     The history is provided by the patient.      Monica Matos is a 28 year old adult with a history of an umbilical hernia who presents with worsening chest congestion for a few days. He notes he had been coughing up black mucus and has associated shortness of breath and chest tightness. He adds he has been vomiting for and had these symptoms for two weeks. He denies history of asthma or smoking. He denies fever but notes he has had chills and sweats.     Independent Historian:   None - Patient Only    Review of External Notes:   I reviewed the patient's last ER visit from 06/12/2023.     Medications:    Albuterol inhaler  Chlordiazepoxide   Omeprazole   Ondansetron   Testosterone cypionate    Past Medical History:    Major depressive disorder  Borderline personality disorder  Migraine variant with headache  Umbilical hernia   Post concussive syndrome  Alcohol withdrawal syndrome     Physical Exam   Patient Vitals for the past 24 hrs:   BP Temp Temp src Pulse Resp SpO2 Height Weight   09/24/23 1755 -- -- -- 86 -- 100 % -- --   09/24/23 1740 139/82 -- -- 81 17 99 % -- --   09/24/23 1524 (!) 151/107 98.3  F (36.8  C) Oral 74 20 100 % 1.702 m (5' 7\") 71.6 kg (157 lb 13.6 oz)      Physical Exam  General: Patient is well appearing. No distress.  Head: Atraumatic.  Eyes: Conjunctivae and EOM are normal. No scleral icterus.  Neck: Normal range of motion. Neck supple.   Cardiovascular: Normal rate, regular rhythm, normal heart sounds and intact distal pulses.   Pulmonary/Chest: Breath sounds normal. No respiratory distress.  Abdominal: Soft. Bowel sounds are normal. No distension. No tenderness. No rebound or guarding.   Musculoskeletal: Normal range of motion.  Skin: Warm and dry. No rash noted. Not diaphoretic.      Emergency Department Course   ECG  ECG results from 09/24/23   EKG 12 lead     Value    Systolic Blood " Pressure     Diastolic Blood Pressure     Ventricular Rate 87    Atrial Rate 87    OR Interval 148    QRS Duration 72        QTc 423    P Axis 82    R AXIS 78    T Axis 52    Interpretation ECG      Sinus rhythm  Right atrial enlargement  Borderline ECG  When compared with ECG of 09-OCT-2022 03:34,  No significant change was found       Imaging:  XR Chest 2 Views   Final Result   IMPRESSION: Negative chest.         Report per radiology    Laboratory:  Labs Ordered and Resulted from Time of ED Arrival to Time of ED Departure   CBC WITH PLATELETS AND DIFFERENTIAL - Abnormal       Result Value    WBC Count 10.3      RBC Count 5.93 (*)     Hemoglobin 16.0      Hematocrit 49.2      MCV 83      MCH 27.0      MCHC 32.5      RDW 13.0      Platelet Count 326     ROUTINE UA WITH MICROSCOPIC REFLEX TO CULTURE - Abnormal    Color Urine Yellow      Appearance Urine Clear      Glucose Urine Negative      Bilirubin Urine Negative      Ketones Urine Negative      Specific Gravity Urine 1.032      Blood Urine Negative      pH Urine 7.5 (*)     Protein Albumin Urine 30 (*)     Urobilinogen Urine Normal      Nitrite Urine Negative      Leukocyte Esterase Urine Negative      Mucus Urine Present (*)     RBC Urine 1      WBC Urine 1      Squamous Epithelials Urine 2 (*)    INFLUENZA A/B, RSV, & SARS-COV2 PCR - Normal    Influenza A PCR Negative      Influenza B PCR Negative      RSV PCR Negative      SARS CoV2 PCR Negative     BASIC METABOLIC PANEL - Normal    Sodium 138      Potassium 4.1      Chloride 100      Carbon Dioxide (CO2) 24      Anion Gap 14      Urea Nitrogen 10.5      Creatinine 0.87      Calcium 9.7      Glucose 94      GFR Estimate >90     HEPATIC FUNCTION PANEL - Normal    Protein Total 7.9      Albumin 4.9      Bilirubin Total 0.7      Alkaline Phosphatase 53      AST 35      ALT 31      Bilirubin Direct <0.20     LIPASE - Normal    Lipase 47     HCG QUALITATIVE URINE - Normal    hCG Urine Qualitative Negative      TROPONIN T, HIGH SENSITIVITY - Normal    Troponin T, High Sensitivity <6     DIFFERENTIAL    % Neutrophils 71      % Lymphocytes 23      % Monocytes 5      % Eosinophils 0      % Basophils 1      Absolute Neutrophils 7.3      Absolute Lymphocytes 2.4      Absolute Monocytes 0.5      Absolute Eosinophils 0.0      Absolute Basophils 0.1      RBC Morphology Confirmed RBC Indices      Platelet Assessment        Value: Automated Count Confirmed. Platelet morphology is normal.      Emergency Department Course & Assessments:    Interventions:  Medications   sodium chloride 0.9% BOLUS 1,000 mL (1,000 mLs Intravenous $New Bag 23 1755)   famotidine (PEPCID) injection 20 mg (20 mg Intravenous $Given 23 1800)      Independent Interpretation (X-rays, CTs, rhythm strip):  I reviewed the patient's chest x-ray and agree with the radiologist's interpretation.     Assessments/Consultations/Discussion of Management or Tests:  ED Course as of 23 1825   Sun Sep 24, 2023   171 I obtained the patient's history and examined as noted above.    172 I reviewed the patient's EKG.     I rechecked the patient and explained findings.      Social Determinants of Health affecting care:   None    Disposition:  The patient was discharged to home.     Impression & Plan      Medical Decision Makin year old adult who presents with chest pain and nausea and vomiting for two weeks which he notes worsened in the past few days.  Patient history and records reviewed. Broad differential considered including: ACS, PE, aortic dissection, myocarditis, pericarditis, pneumothorax, pneumonia, esophageal rupture, musculoskeletal chest wall pain, referred pain from abdomen.  Patient well appearing with non-concerning vitals.  EKG without evidence of acute ischemia or arrhythmia.  Chest x-ray negative and low risk for PE PERC criteria and would not pursue further workup at this time.  Chest x-ray shows no evidence of pneumonia,  pneumothorax, CHF, or mediastinal widening.  No other high risk factors present to suggest aortic dissection, and feel this is very unlikely at this time.  Abdominal examination non-tender and doubt referred pain from intra-abdominal catastrophe, pancreatitis, gallbladder pathology. Patient's labs do not show evidence of dehydration. All questions and concerns were answered. The patient was discharged home and recommended to follow up with his primary physician and return with any new or worsening symptoms.      Diagnosis:    ICD-10-CM    1. Cough, unspecified type  R05.9       2. Nausea and vomiting, unspecified vomiting type  R11.2          Discharge Medications:  New Prescriptions    ONDANSETRON (ZOFRAN ODT) 4 MG ODT TAB    Take 1 tablet (4 mg) by mouth every 6 hours as needed for nausea      Scribe Disclosure:  I, Tish Jurado, am serving as a scribe at 5:08 PM on 9/24/2023 to document services personally performed by Milton Loo MD based on my observations and the provider's statements to me.      9/24/2023   Milton Loo MD Stevens, Andrew C, MD  09/25/23 0028

## 2023-09-24 NOTE — ED TRIAGE NOTES
"Pt presents with c/o SOB, CP, cough, and \"shakiness.\" Home covid test on Monday negative. Has had sx for 2 wks. The past three days sx have worsened prompting visit to ED. A & Ox4.     Triage Assessment       Row Name 09/24/23 1527       Triage Assessment (Adult)    Airway WDL WDL       Respiratory WDL    Respiratory WDL X;cough    Cough Frequency infrequent       Skin Circulation/Temperature WDL    Skin Circulation/Temperature WDL WDL       Cardiac WDL    Cardiac WDL X;chest pain       Peripheral/Neurovascular WDL    Peripheral Neurovascular WDL WDL       Cognitive/Neuro/Behavioral WDL    Cognitive/Neuro/Behavioral WDL WDL                    "

## 2023-09-25 LAB
ATRIAL RATE - MUSE: 87 BPM
DIASTOLIC BLOOD PRESSURE - MUSE: NORMAL MMHG
INTERPRETATION ECG - MUSE: NORMAL
P AXIS - MUSE: 82 DEGREES
PR INTERVAL - MUSE: 148 MS
QRS DURATION - MUSE: 72 MS
QT - MUSE: 352 MS
QTC - MUSE: 423 MS
R AXIS - MUSE: 78 DEGREES
SYSTOLIC BLOOD PRESSURE - MUSE: NORMAL MMHG
T AXIS - MUSE: 52 DEGREES
VENTRICULAR RATE- MUSE: 87 BPM

## 2023-10-17 ENCOUNTER — HOSPITAL ENCOUNTER (EMERGENCY)
Facility: CLINIC | Age: 28
Discharge: HOME | End: 2023-10-17
Attending: STUDENT IN AN ORGANIZED HEALTH CARE EDUCATION/TRAINING PROGRAM | Admitting: STUDENT IN AN ORGANIZED HEALTH CARE EDUCATION/TRAINING PROGRAM
Payer: COMMERCIAL

## 2023-10-17 VITALS
BODY MASS INDEX: 24.48 KG/M2 | TEMPERATURE: 98.3 F | SYSTOLIC BLOOD PRESSURE: 130 MMHG | OXYGEN SATURATION: 97 % | HEART RATE: 58 BPM | HEIGHT: 67 IN | RESPIRATION RATE: 20 BRPM | DIASTOLIC BLOOD PRESSURE: 80 MMHG | WEIGHT: 156 LBS

## 2023-10-17 DIAGNOSIS — F10.930 ALCOHOL WITHDRAWAL SYNDROME WITHOUT COMPLICATION (H): Primary | ICD-10-CM

## 2023-10-17 DIAGNOSIS — F10.90 ALCOHOL USE DISORDER: ICD-10-CM

## 2023-10-17 LAB
ALBUMIN SERPL BCG-MCNC: 4.7 G/DL (ref 3.5–5.2)
ALP SERPL-CCNC: 43 U/L (ref 35–129)
ALT SERPL W P-5'-P-CCNC: 15 U/L (ref 0–70)
ANION GAP SERPL CALCULATED.3IONS-SCNC: 15 MMOL/L (ref 7–15)
AST SERPL W P-5'-P-CCNC: 35 U/L (ref 0–45)
BILIRUB DIRECT SERPL-MCNC: 0.25 MG/DL (ref 0–0.3)
BILIRUB SERPL-MCNC: 1.3 MG/DL
BUN SERPL-MCNC: 9.3 MG/DL (ref 6–20)
CALCIUM SERPL-MCNC: 9.3 MG/DL (ref 8.6–10)
CHLORIDE SERPL-SCNC: 98 MMOL/L (ref 98–107)
CREAT SERPL-MCNC: 0.84 MG/DL (ref 0.51–1.17)
DEPRECATED HCO3 PLAS-SCNC: 22 MMOL/L (ref 22–29)
EGFRCR SERPLBLD CKD-EPI 2021: >90 ML/MIN/1.73M2
GLUCOSE SERPL-MCNC: 92 MG/DL (ref 70–99)
LIPASE SERPL-CCNC: 24 U/L (ref 13–60)
POTASSIUM SERPL-SCNC: 3.8 MMOL/L (ref 3.4–5.3)
PROT SERPL-MCNC: 7 G/DL (ref 6.4–8.3)
SODIUM SERPL-SCNC: 135 MMOL/L (ref 135–145)

## 2023-10-17 PROCEDURE — 99284 EMERGENCY DEPT VISIT MOD MDM: CPT | Mod: 25

## 2023-10-17 PROCEDURE — 80053 COMPREHEN METABOLIC PANEL: CPT | Performed by: STUDENT IN AN ORGANIZED HEALTH CARE EDUCATION/TRAINING PROGRAM

## 2023-10-17 PROCEDURE — 96375 TX/PRO/DX INJ NEW DRUG ADDON: CPT

## 2023-10-17 PROCEDURE — 82248 BILIRUBIN DIRECT: CPT | Performed by: STUDENT IN AN ORGANIZED HEALTH CARE EDUCATION/TRAINING PROGRAM

## 2023-10-17 PROCEDURE — 96374 THER/PROPH/DIAG INJ IV PUSH: CPT

## 2023-10-17 PROCEDURE — 93005 ELECTROCARDIOGRAM TRACING: CPT | Mod: RTG

## 2023-10-17 PROCEDURE — 36415 COLL VENOUS BLD VENIPUNCTURE: CPT | Performed by: STUDENT IN AN ORGANIZED HEALTH CARE EDUCATION/TRAINING PROGRAM

## 2023-10-17 PROCEDURE — 83690 ASSAY OF LIPASE: CPT | Performed by: STUDENT IN AN ORGANIZED HEALTH CARE EDUCATION/TRAINING PROGRAM

## 2023-10-17 PROCEDURE — 250N000011 HC RX IP 250 OP 636: Mod: JZ | Performed by: STUDENT IN AN ORGANIZED HEALTH CARE EDUCATION/TRAINING PROGRAM

## 2023-10-17 PROCEDURE — 96361 HYDRATE IV INFUSION ADD-ON: CPT

## 2023-10-17 PROCEDURE — 258N000003 HC RX IP 258 OP 636: Performed by: STUDENT IN AN ORGANIZED HEALTH CARE EDUCATION/TRAINING PROGRAM

## 2023-10-17 RX ORDER — DIAZEPAM 10 MG/2ML
5 INJECTION, SOLUTION INTRAMUSCULAR; INTRAVENOUS ONCE
Status: COMPLETED | OUTPATIENT
Start: 2023-10-17 | End: 2023-10-17

## 2023-10-17 RX ORDER — PHENOBARBITAL SODIUM 130 MG/ML
130 INJECTION, SOLUTION INTRAMUSCULAR; INTRAVENOUS ONCE
Status: DISCONTINUED | OUTPATIENT
Start: 2023-10-17 | End: 2023-10-17

## 2023-10-17 RX ORDER — NALTREXONE HYDROCHLORIDE 50 MG/1
50 TABLET, FILM COATED ORAL DAILY
Qty: 30 TABLET | Refills: 0 | Status: SHIPPED | OUTPATIENT
Start: 2023-10-17 | End: 2023-11-16

## 2023-10-17 RX ORDER — ONDANSETRON 4 MG/1
4 TABLET, ORALLY DISINTEGRATING ORAL EVERY 8 HOURS PRN
Qty: 10 TABLET | Refills: 0 | Status: SHIPPED | OUTPATIENT
Start: 2023-10-17 | End: 2023-10-20

## 2023-10-17 RX ORDER — METOCLOPRAMIDE HYDROCHLORIDE 5 MG/ML
10 INJECTION INTRAMUSCULAR; INTRAVENOUS ONCE
Status: COMPLETED | OUTPATIENT
Start: 2023-10-17 | End: 2023-10-17

## 2023-10-17 RX ADMIN — METOCLOPRAMIDE HYDROCHLORIDE 10 MG: 5 INJECTION INTRAMUSCULAR; INTRAVENOUS at 19:38

## 2023-10-17 RX ADMIN — DIAZEPAM 5 MG: 5 INJECTION INTRAMUSCULAR; INTRAVENOUS at 19:56

## 2023-10-17 RX ADMIN — SODIUM CHLORIDE 1000 ML: 9 INJECTION, SOLUTION INTRAVENOUS at 19:38

## 2023-10-17 ASSESSMENT — ACTIVITIES OF DAILY LIVING (ADL): ADLS_ACUITY_SCORE: 33

## 2023-10-17 NOTE — ED TRIAGE NOTES
Pt presents for evaluation of alcohol withdrawal. Pt has been drinking 2-4 cans of 4 Loco daily x 2 weeks. Last drink was last night around 2200. Currently c/o abdominal pain, nausea, vomiting, headache, pins and needles sensation in feet and auditory hallucinations. Has gone through withdrawal before. No hx of seizures. CIWA of 13.

## 2023-10-18 LAB
ATRIAL RATE - MUSE: 75 BPM
DIASTOLIC BLOOD PRESSURE - MUSE: NORMAL MMHG
INTERPRETATION ECG - MUSE: NORMAL
P AXIS - MUSE: 77 DEGREES
PR INTERVAL - MUSE: 138 MS
QRS DURATION - MUSE: 82 MS
QT - MUSE: 380 MS
QTC - MUSE: 424 MS
R AXIS - MUSE: 81 DEGREES
SYSTOLIC BLOOD PRESSURE - MUSE: NORMAL MMHG
T AXIS - MUSE: 64 DEGREES
VENTRICULAR RATE- MUSE: 75 BPM

## 2023-10-18 NOTE — ED NOTES
Provided MICHAEL resource information as well as detailed information on [address] detox and the [facility] to patient. Includes places to acquire a Rule 25 or other assessments related, numbers and addresses for contact.

## 2023-10-18 NOTE — ED PROVIDER NOTES
"History   Chief Complaint:  Withdrawal       HPI:  Monica Matos is a pleasant 28 year old adult with bipolar disorder, alcohol use disorder, and PTSD presenting with withdrawal symptoms with the patient's last drink being 2200 last night. The patient reports being a daily drinker, 2-4 pints of 14% alcohol for the past two weeks and before that was drinking 2 L of Vodka every two days. He and his girlfriend report that last month they had been sober for about two months. He reports experiencing withdrawal before including symptoms he is currently experiencing. He is experiencing tremors, \"pins and needles\" in his feet, vomiting. He has vomited 10 times today, 4-5 times being in the last hour. The patient does smoke Marijuana, but has not recently, but denies tobacco use, other medical complications, or any withdrawal seizures.    Independent Historian:   The patient's girlfriend aided in the history noted above.    Review of External Notes:   None.    Medications:    Albuterol  Librium  Prilosec  Zofran    Past Medical History:    MDD  Alcoholic intoxication  Bipolar type I  Anxiety     Physical Exam   Patient Vitals for the past 24 hrs:   BP Temp Temp src Pulse Resp SpO2 Height Weight   10/17/23 2000 133/89 -- -- 58 -- 95 % -- --   10/17/23 1853 (!) 141/103 98.3  F (36.8  C) Oral 104 20 98 % 1.702 m (5' 7\") 70.8 kg (156 lb)      Physical Exam  Vitals and nursing note reviewed.   Constitutional:       General: He is not in acute distress.     Appearance: Normal appearance. He is not ill-appearing or diaphoretic.      Comments: Mild tremor   HENT:      Mouth/Throat:      Comments: Slight tongue fasciculations  Cardiovascular:      Rate and Rhythm: Regular rhythm. Tachycardia present.   Pulmonary:      Effort: Pulmonary effort is normal.   Abdominal:      General: Abdomen is flat.      Palpations: Abdomen is soft.      Tenderness: There is no abdominal tenderness. There is no guarding or rebound.   Skin:     " General: Skin is warm and dry.   Neurological:      Mental Status: He is alert and oriented to person, place, and time.            Emergency Department Course   ECG  None performed    Imaging:  No orders to display      None performed    Laboratory:  Labs Ordered and Resulted from Time of ED Arrival to Time of ED Departure   LIPASE - Normal       Result Value    Lipase 24     BASIC METABOLIC PANEL - Normal    Sodium 135      Potassium 3.8      Chloride 98      Carbon Dioxide (CO2) 22      Anion Gap 15      Urea Nitrogen 9.3      Creatinine 0.84      GFR Estimate >90      Calcium 9.3      Glucose 92     HEPATIC FUNCTION PANEL        Procedures   None performed       Emergency Department Course & Assessments:       Interventions:  Medications   sodium chloride 0.9% BOLUS 1,000 mL (1,000 mLs Intravenous $New Bag 10/17/23 1938)   metoclopramide (REGLAN) injection 10 mg (10 mg Intravenous $Given 10/17/23 1938)   diazepam (VALIUM) injection 5 mg (5 mg Intravenous $Given 10/17/23 1956)      Assessments:  1910 I obtained history and examined the patient as noted above.     Independent Interpretation (X-rays, CTs, rhythm strip):  None    Consultations/Discussion of Management or Tests:  1919 I spoke with the ED pharmacist about the patient's presentation, findings, and plan of care.     Social Determinants of Health affecting care:   None.      Disposition:  The patient was discharged to home.     Impression & Plan   CMS Diagnoses: None       Medical Decision Making:   Patient presenting with concern for alcohol withdrawal.  Patient does exhibit symptoms of alcohol withdrawal, with anxiety, tachycardia, nausea and vomiting.  Vital signs notable for tachycardia on arrival and mild elevated blood pressure.  Also considered pancreatitis, hepatitis, and evaluated patient's renal function and electrolytes.  Treated patient's symptoms with metoclopramide and diazepam, along with fluids.  Patient had significant relief with these  interventions.  His heart rate normalized.     Labs are reassuring against hepatitis or pancreatitis, electrolyte derangement or renal dysfunction.     On initial evaluation, the patient was clear that he would like not wish to be sent to detox or admitted.  He has some family obligations he needs to attend to this evening.  Discussed starting the patient on naltrexone.  He was prescribed this previously.  He would like me to start this for him.  He does not take any opioids in any form.     Patient was able to tolerate oral intake prior to discharge.    We will plan for discharge with naltrexone, ondansetron for nausea, primary care referral and follow-up. Findings were discussed.   Additional verbal instructions were provided.   I discussed specific warning signs and instructed the patient to return to the emergency department if there are any concerns.  Understanding of instructions was voiced, questions were answered and the patient was discharged.     Critical Care time was 0 minutes for this patient excluding procedures.       Diagnosis:    ICD-10-CM    1. Alcohol withdrawal syndrome without complication (H)  F10.930       2. Alcohol use disorder  F10.90 Primary Care Referral           Discharge Medications:  New Prescriptions    NALTREXONE (DEPADE/REVIA) 50 MG TABLET    Take 1 tablet (50 mg) by mouth daily for 30 days    ONDANSETRON (ZOFRAN ODT) 4 MG ODT TAB    Take 1 tablet (4 mg) by mouth every 8 hours as needed for nausea       Scribe Disclosure:  I, John Sanchez, am serving as a scribe at 7:07 PM on 10/17/2023 to document services personally performed by Jefe Shannon MD based on my observations and the provider's statements to me.      Jefe Shannon MD  10/17/23 2027

## 2023-10-18 NOTE — DISCHARGE INSTRUCTIONS
Thank you for allowing us to evaluate you today.  Follow up with a primary care clinician  in 1 week for reevaluation.. I placed a referral for you.  Take the ondansetron as prescribed for nausea.   Take the naltrexone every day for your alcohol cravings.   Please read the guidance provided with your discharge instructions.  Immediately return to the emergency department with any concerns.

## 2023-12-23 ENCOUNTER — HOSPITAL ENCOUNTER (EMERGENCY)
Facility: CLINIC | Age: 28
Discharge: HOME | End: 2023-12-23
Attending: EMERGENCY MEDICINE | Admitting: EMERGENCY MEDICINE
Payer: COMMERCIAL

## 2023-12-23 VITALS
SYSTOLIC BLOOD PRESSURE: 122 MMHG | TEMPERATURE: 98.6 F | HEART RATE: 56 BPM | OXYGEN SATURATION: 99 % | RESPIRATION RATE: 18 BRPM | DIASTOLIC BLOOD PRESSURE: 84 MMHG

## 2023-12-23 DIAGNOSIS — H81.10 BENIGN PAROXYSMAL POSITIONAL VERTIGO, UNSPECIFIED LATERALITY: ICD-10-CM

## 2023-12-23 LAB
ALBUMIN SERPL BCG-MCNC: 5 G/DL (ref 3.5–5.2)
ALP SERPL-CCNC: 52 U/L (ref 40–150)
ALT SERPL W P-5'-P-CCNC: 24 U/L (ref 0–70)
ANION GAP SERPL CALCULATED.3IONS-SCNC: 11 MMOL/L (ref 7–15)
AST SERPL W P-5'-P-CCNC: 30 U/L (ref 0–45)
BASOPHILS # BLD AUTO: 0 10E3/UL (ref 0–0.2)
BASOPHILS NFR BLD AUTO: 0 %
BILIRUB DIRECT SERPL-MCNC: 0.2 MG/DL (ref 0–0.3)
BILIRUB SERPL-MCNC: 0.6 MG/DL
BUN SERPL-MCNC: 11.2 MG/DL (ref 6–20)
CALCIUM SERPL-MCNC: 9.3 MG/DL (ref 8.6–10)
CHLORIDE SERPL-SCNC: 101 MMOL/L (ref 98–107)
CREAT SERPL-MCNC: 0.85 MG/DL (ref 0.51–1.17)
DEPRECATED HCO3 PLAS-SCNC: 25 MMOL/L (ref 22–29)
EGFRCR SERPLBLD CKD-EPI 2021: >90 ML/MIN/1.73M2
EOSINOPHIL # BLD AUTO: 0.2 10E3/UL (ref 0–0.7)
EOSINOPHIL NFR BLD AUTO: 2 %
ERYTHROCYTE [DISTWIDTH] IN BLOOD BY AUTOMATED COUNT: 13.6 % (ref 10–15)
ETHANOL SERPL-MCNC: <0.01 G/DL
FLUAV RNA SPEC QL NAA+PROBE: NEGATIVE
FLUBV RNA RESP QL NAA+PROBE: NEGATIVE
GLUCOSE SERPL-MCNC: 106 MG/DL (ref 70–99)
HCT VFR BLD AUTO: 42.6 % (ref 35–53)
HGB BLD-MCNC: 13.9 G/DL (ref 11.7–17.7)
HOLD SPECIMEN: NORMAL
HOLD SPECIMEN: NORMAL
IMM GRANULOCYTES # BLD: 0 10E3/UL
IMM GRANULOCYTES NFR BLD: 0 %
LYMPHOCYTES # BLD AUTO: 1.2 10E3/UL (ref 0.8–5.3)
LYMPHOCYTES NFR BLD AUTO: 20 %
MCH RBC QN AUTO: 27.3 PG (ref 26.5–33)
MCHC RBC AUTO-ENTMCNC: 32.6 G/DL (ref 31.5–36.5)
MCV RBC AUTO: 84 FL (ref 78–100)
MONOCYTES # BLD AUTO: 0.7 10E3/UL (ref 0–1.3)
MONOCYTES NFR BLD AUTO: 12 %
NEUTROPHILS # BLD AUTO: 4.1 10E3/UL (ref 1.6–8.3)
NEUTROPHILS NFR BLD AUTO: 66 %
NRBC # BLD AUTO: 0 10E3/UL
NRBC BLD AUTO-RTO: 0 /100
PLATELET # BLD AUTO: 318 10E3/UL (ref 150–450)
POTASSIUM SERPL-SCNC: 4.1 MMOL/L (ref 3.4–5.3)
PROT SERPL-MCNC: 8 G/DL (ref 6.4–8.3)
RBC # BLD AUTO: 5.1 10E6/UL (ref 3.8–5.9)
RSV RNA SPEC NAA+PROBE: NEGATIVE
SARS-COV-2 RNA RESP QL NAA+PROBE: NEGATIVE
SODIUM SERPL-SCNC: 137 MMOL/L (ref 135–145)
TROPONIN T SERPL HS-MCNC: <6 NG/L
TSH SERPL DL<=0.005 MIU/L-ACNC: 0.8 UIU/ML (ref 0.3–4.2)
WBC # BLD AUTO: 6.3 10E3/UL (ref 4–11)

## 2023-12-23 PROCEDURE — 250N000011 HC RX IP 250 OP 636: Performed by: EMERGENCY MEDICINE

## 2023-12-23 PROCEDURE — 82248 BILIRUBIN DIRECT: CPT | Performed by: EMERGENCY MEDICINE

## 2023-12-23 PROCEDURE — 93005 ELECTROCARDIOGRAM TRACING: CPT

## 2023-12-23 PROCEDURE — 80053 COMPREHEN METABOLIC PANEL: CPT | Performed by: EMERGENCY MEDICINE

## 2023-12-23 PROCEDURE — 96361 HYDRATE IV INFUSION ADD-ON: CPT

## 2023-12-23 PROCEDURE — 87637 SARSCOV2&INF A&B&RSV AMP PRB: CPT | Performed by: EMERGENCY MEDICINE

## 2023-12-23 PROCEDURE — 36415 COLL VENOUS BLD VENIPUNCTURE: CPT | Performed by: EMERGENCY MEDICINE

## 2023-12-23 PROCEDURE — 250N000013 HC RX MED GY IP 250 OP 250 PS 637: Performed by: EMERGENCY MEDICINE

## 2023-12-23 PROCEDURE — 258N000003 HC RX IP 258 OP 636: Performed by: EMERGENCY MEDICINE

## 2023-12-23 PROCEDURE — 84443 ASSAY THYROID STIM HORMONE: CPT | Performed by: EMERGENCY MEDICINE

## 2023-12-23 PROCEDURE — 84484 ASSAY OF TROPONIN QUANT: CPT | Performed by: EMERGENCY MEDICINE

## 2023-12-23 PROCEDURE — 82077 ASSAY SPEC XCP UR&BREATH IA: CPT | Performed by: EMERGENCY MEDICINE

## 2023-12-23 PROCEDURE — 96374 THER/PROPH/DIAG INJ IV PUSH: CPT | Mod: 59

## 2023-12-23 PROCEDURE — 85025 COMPLETE CBC W/AUTO DIFF WBC: CPT | Performed by: EMERGENCY MEDICINE

## 2023-12-23 PROCEDURE — 99284 EMERGENCY DEPT VISIT MOD MDM: CPT | Mod: 25

## 2023-12-23 RX ORDER — ONDANSETRON 2 MG/ML
4 INJECTION INTRAMUSCULAR; INTRAVENOUS ONCE
Status: COMPLETED | OUTPATIENT
Start: 2023-12-23 | End: 2023-12-23

## 2023-12-23 RX ORDER — MECLIZINE HCL 12.5 MG 12.5 MG/1
12.5 TABLET ORAL 4 TIMES DAILY PRN
Qty: 30 TABLET | Refills: 0 | Status: SHIPPED | OUTPATIENT
Start: 2023-12-23

## 2023-12-23 RX ORDER — MECLIZINE HYDROCHLORIDE 25 MG/1
25 TABLET ORAL ONCE
Status: COMPLETED | OUTPATIENT
Start: 2023-12-23 | End: 2023-12-23

## 2023-12-23 RX ORDER — ONDANSETRON 4 MG/1
4 TABLET, ORALLY DISINTEGRATING ORAL EVERY 8 HOURS PRN
Qty: 10 TABLET | Refills: 0 | Status: SHIPPED | OUTPATIENT
Start: 2023-12-23 | End: 2023-12-26

## 2023-12-23 RX ADMIN — ONDANSETRON 4 MG: 2 INJECTION INTRAMUSCULAR; INTRAVENOUS at 22:01

## 2023-12-23 RX ADMIN — MECLIZINE HYDROCHLORIDE 25 MG: 25 TABLET ORAL at 21:47

## 2023-12-23 RX ADMIN — SODIUM CHLORIDE 1000 ML: 9 INJECTION, SOLUTION INTRAVENOUS at 22:00

## 2023-12-23 ASSESSMENT — ACTIVITIES OF DAILY LIVING (ADL): ADLS_ACUITY_SCORE: 35

## 2023-12-23 NOTE — LETTER
December 23, 2023      To Whom It May Concern:      Monica Matos was seen in our Emergency Department today, 12/23/23.  I expect his condition to improve over the next 2 days.  He may return to work/school when improved.    Sincerely,        Jessica Nicholas MD

## 2023-12-24 LAB
ATRIAL RATE - MUSE: 50 BPM
DIASTOLIC BLOOD PRESSURE - MUSE: NORMAL MMHG
INTERPRETATION ECG - MUSE: NORMAL
P AXIS - MUSE: 66 DEGREES
PR INTERVAL - MUSE: 152 MS
QRS DURATION - MUSE: 88 MS
QT - MUSE: 434 MS
QTC - MUSE: 395 MS
R AXIS - MUSE: 78 DEGREES
SYSTOLIC BLOOD PRESSURE - MUSE: NORMAL MMHG
T AXIS - MUSE: 57 DEGREES
VENTRICULAR RATE- MUSE: 50 BPM

## 2023-12-24 NOTE — ED TRIAGE NOTES
Dizziness x3 days progressively worsening with photosensitivity, States he checked his HR at home and it got as low as 50bmp. Confused yesterday since resolved. Nausea, denies fevers vomitting or diarrhea. States intermit palpitations. ABCs intact A&Ox4

## 2023-12-24 NOTE — DISCHARGE INSTRUCTIONS
Push fluids and rest  Zofran to prevent nausea  Meclizine for dizziness  Return if worsening symptoms

## 2023-12-24 NOTE — ED PROVIDER NOTES
"  History     Chief Complaint:  Dizziness       The history is provided by the patient.      Monica Matos is a 28 year old adult who presents for dizziness. Patient says that symptoms started three days ago and have been progressively getting worse. They describe it as \"room spinning\" and there is no specific trigger. While at work today, they were unable to walk in a straight line and had to leave early. The dizziness is worse when standing, and nothing makes it better. They also endorses nausea, ear ringing and pain, and blurred vision. Patient has never had these symptoms before. No recent falls or trauma. Patient denies any vomiting. They had a cold last week and note residual cough and rhinorrhea. No medications have been taken at home. Patient denies use of drugs or alcohol.  He endorses sinus congestion.     Independent Historian:     at bedside endorses history as stated above.     Review of External Notes:  None      Medications:    Patient denies any daily medications       Past Medical History:    Patient denies any past medical history     Physical Exam   Patient Vitals for the past 24 hrs:   BP Temp Pulse Resp SpO2   12/23/23 1946 (!) 138/91 98.6  F (37  C) 58 18 99 %        Physical Exam  Constitutional:       Appearance: He is well-developed.   HENT:      Right Ear: Tympanic membrane and external ear normal.      Left Ear: Tympanic membrane and external ear normal.      Ears:      Comments: Clear effusion seen behind both TM     Mouth/Throat:      Mouth: Mucous membranes are moist.      Pharynx: Oropharynx is clear. No oropharyngeal exudate or posterior oropharyngeal erythema.   Eyes:      General: No scleral icterus.     Extraocular Movements: Extraocular movements intact.      Conjunctiva/sclera: Conjunctivae normal.      Pupils: Pupils are equal, round, and reactive to light.   Cardiovascular:      Rate and Rhythm: Normal rate and regular rhythm.      Heart sounds: Normal heart " sounds. No murmur heard.     No friction rub. No gallop.   Pulmonary:      Effort: Pulmonary effort is normal. No respiratory distress.      Breath sounds: Normal breath sounds. No stridor. No wheezing, rhonchi or rales.   Chest:      Chest wall: No tenderness.   Abdominal:      General: Bowel sounds are normal. There is no distension.      Palpations: Abdomen is soft. There is no mass.      Tenderness: There is no abdominal tenderness.   Musculoskeletal:         General: Normal range of motion.      Cervical back: Normal range of motion and neck supple.   Skin:     General: Skin is warm and dry.      Capillary Refill: Capillary refill takes less than 2 seconds.      Findings: No rash.   Neurological:      General: No focal deficit present.      Mental Status: He is alert.      Cranial Nerves: No cranial nerve deficit.      Motor: No weakness.      Gait: Gait normal.           Emergency Department Course   ECG  ECG taken at 2031, ECG read at 2034  Sinus bradycardia  Otherwise normal ECG   Rate 50 bpm. WV interval 152 ms. QRS duration 88 ms. QT/QTc 434/395 ms. P-R-T axes 66 78 57.      Laboratory:  Labs Ordered and Resulted from Time of ED Arrival to Time of ED Departure   BASIC METABOLIC PANEL - Abnormal       Result Value    Sodium 137      Potassium 4.1      Chloride 101      Carbon Dioxide (CO2) 25      Anion Gap 11      Urea Nitrogen 11.2      Creatinine 0.85      GFR Estimate >90      Calcium 9.3      Glucose 106 (*)    INFLUENZA A/B, RSV, & SARS-COV2 PCR - Normal    Influenza A PCR Negative      Influenza B PCR Negative      RSV PCR Negative      SARS CoV2 PCR Negative     HEPATIC FUNCTION PANEL - Normal    Protein Total 8.0      Albumin 5.0      Bilirubin Total 0.6      Alkaline Phosphatase 52      AST 30      ALT 24      Bilirubin Direct 0.20     TROPONIN T, HIGH SENSITIVITY - Normal    Troponin T, High Sensitivity <6     TSH WITH FREE T4 REFLEX - Normal    TSH 0.80     ETHYL ALCOHOL LEVEL - Normal     Alcohol ethyl <0.01     CBC WITH PLATELETS AND DIFFERENTIAL    WBC Count 6.3      RBC Count 5.10      Hemoglobin 13.9      Hematocrit 42.6      MCV 84      MCH 27.3      MCHC 32.6      RDW 13.6      Platelet Count 318      % Neutrophils 66      % Lymphocytes 20      % Monocytes 12      % Eosinophils 2      % Basophils 0      % Immature Granulocytes 0      NRBCs per 100 WBC 0      Absolute Neutrophils 4.1      Absolute Lymphocytes 1.2      Absolute Monocytes 0.7      Absolute Eosinophils 0.2      Absolute Basophils 0.0      Absolute Immature Granulocytes 0.0      Absolute NRBCs 0.0          Emergency Department Course & Assessments:     Interventions:  Medications   sodium chloride 0.9% BOLUS 1,000 mL (has no administration in time range)   ondansetron (ZOFRAN) injection 4 mg (has no administration in time range)   meclizine (ANTIVERT) tablet 25 mg (25 mg Oral $Given 12/23/23 2147)        Assessments:  2128 I obtained history and examined the patient as noted above.     Independent Interpretation (X-rays, CTs, rhythm strip):  None    Consultations/Discussion of Management or Tests:  None       Social Determinants of Health affecting care:  None     Disposition:  The patient was discharged to home.     Impression & Plan    Medical Decision Making:  Patient presents today for evaluation of dizziness described as a spinning sensation.  Given the recent URI and the continued congestion, is most likely the cause of his vertigo.  There are no acute finding on his physical exam that is concerning.  After fluids and Zofran and meclizine, his symptoms resolved.  He passed a p.o. challenge without difficulty.  I recommend he continue using Zofran and meclizine as needed.  He can use any over-the-counter sinus decongestant as needed.  Return precaution provided.  He will follow-up with his doctor as needed.    Diagnosis:    ICD-10-CM    1. Benign paroxysmal positional vertigo, unspecified laterality  H81.10            Discharge  Medications:  Discharge Medication List as of 12/23/2023 11:51 PM        START taking these medications    Details   meclizine (ANTIVERT) 12.5 MG tablet Take 1 tablet (12.5 mg) by mouth 4 times daily as needed for dizziness, Disp-30 tablet, R-0, E-Prescribe            Scribe Disclosure:  I, Thialix Johnson, am serving as a scribe at 9:27 PM on 12/23/2023 to document services personally performed by Jessica Nicholas MD based on my observations and the provider's statements to me.    12/23/2023   Jessica Nicholas MD Cheng, Wenlan, MD  12/24/23 7815

## 2024-01-14 ENCOUNTER — HOSPITAL ENCOUNTER (EMERGENCY)
Facility: CLINIC | Age: 29
Discharge: HOME | End: 2024-01-14
Attending: EMERGENCY MEDICINE | Admitting: EMERGENCY MEDICINE
Payer: COMMERCIAL

## 2024-01-14 VITALS
HEART RATE: 70 BPM | SYSTOLIC BLOOD PRESSURE: 133 MMHG | OXYGEN SATURATION: 98 % | DIASTOLIC BLOOD PRESSURE: 88 MMHG | TEMPERATURE: 98 F | RESPIRATION RATE: 20 BRPM

## 2024-01-14 DIAGNOSIS — J10.1 INFLUENZA A: ICD-10-CM

## 2024-01-14 LAB
FLUAV RNA SPEC QL NAA+PROBE: POSITIVE
FLUBV RNA RESP QL NAA+PROBE: NEGATIVE
GROUP A STREP BY PCR: NOT DETECTED
RSV RNA SPEC NAA+PROBE: NEGATIVE
SARS-COV-2 RNA RESP QL NAA+PROBE: NEGATIVE

## 2024-01-14 PROCEDURE — 99283 EMERGENCY DEPT VISIT LOW MDM: CPT

## 2024-01-14 PROCEDURE — 87637 SARSCOV2&INF A&B&RSV AMP PRB: CPT | Performed by: EMERGENCY MEDICINE

## 2024-01-14 PROCEDURE — 87651 STREP A DNA AMP PROBE: CPT | Performed by: EMERGENCY MEDICINE

## 2024-01-14 RX ORDER — BENZONATATE 200 MG/1
200 CAPSULE ORAL 3 TIMES DAILY PRN
Qty: 10 CAPSULE | Refills: 0 | Status: SHIPPED | OUTPATIENT
Start: 2024-01-14 | End: 2024-01-14

## 2024-01-14 RX ORDER — BENZONATATE 200 MG/1
200 CAPSULE ORAL 3 TIMES DAILY PRN
Qty: 10 CAPSULE | Refills: 0 | Status: SHIPPED | OUTPATIENT
Start: 2024-01-14

## 2024-01-14 RX ORDER — IBUPROFEN 200 MG
400 TABLET ORAL ONCE
Status: DISCONTINUED | OUTPATIENT
Start: 2024-01-14 | End: 2024-01-14 | Stop reason: HOSPADM

## 2024-01-14 ASSESSMENT — ACTIVITIES OF DAILY LIVING (ADL): ADLS_ACUITY_SCORE: 33

## 2024-01-14 NOTE — ED PROVIDER NOTES
History     Chief Complaint:  Flu Symptoms       The history is provided by the patient.      Monica Matos is a 28 year old adult who presents to the ED with 4 days of flu-like symptoms which include cough, chest pain, nausea, vomiting, diarrhea, body aches, sore throat and right ear pain. Patient reports taking zofran and ANGELICA cough syrup which have provided minimal relief. Reports sharp lung pain when breathing. Denies leg swelling, urinary problems or other medical problems. Reports not taking any routine medications. Reports his sister got diagnosed with the flu 2 days ago.      Independent Historian:   None - Patient Only    Review of External Notes:   None       Medications:    Librium  Omeprazole  Antivert    Past Medical History:    Major depressive disorder  Borderline personality disorder      Physical Exam   Patient Vitals for the past 24 hrs:   BP Temp Temp src Pulse Resp SpO2   01/14/24 1529 133/88 98  F (36.7  C) -- 70 20 98 %   01/14/24 1346 (!) 144/103 98.1  F (36.7  C) Temporal 68 18 100 %      Physical Exam  Vitals and nursing note reviewed.   Constitutional:       General: He is not in acute distress.     Appearance: He is ill-appearing. He is not toxic-appearing.   HENT:      Head: Normocephalic and atraumatic.      Right Ear: External ear normal.      Left Ear: External ear normal.      Nose: Nose normal.   Eyes:      Conjunctiva/sclera: Conjunctivae normal.   Cardiovascular:      Rate and Rhythm: Normal rate and regular rhythm.      Heart sounds: No murmur heard.  Pulmonary:      Effort: Pulmonary effort is normal. No respiratory distress.      Breath sounds: No wheezing, rhonchi or rales.   Abdominal:      General: Abdomen is flat. There is no distension.      Palpations: Abdomen is soft.      Tenderness: There is no abdominal tenderness. There is no guarding or rebound.   Musculoskeletal:         General: No swelling or deformity.      Cervical back: Normal range of motion and neck  supple.   Skin:     General: Skin is warm and dry.      Findings: No rash.   Neurological:      Mental Status: He is alert and oriented to person, place, and time.   Psychiatric:         Behavior: Behavior normal.           Emergency Department Course     Laboratory:  Labs Ordered and Resulted from Time of ED Arrival to Time of ED Departure   INFLUENZA A/B, RSV, & SARS-COV2 PCR - Abnormal       Result Value    Influenza A PCR Positive (*)     Influenza B PCR Negative      RSV PCR Negative      SARS CoV2 PCR Negative     GROUP A STREPTOCOCCUS PCR THROAT SWAB - Normal    Group A strep by PCR Not Detected        Emergency Department Course & Assessments:       Interventions:  Medications - No data to display       Assessments:  1515 I obtained history and examined the patient as noted above. I explained findings to the patient and we discussed plan for discharge. The patient is comfortable with this plan.      Independent Interpretation (X-rays, CTs, rhythm strip):  None    Consultations/Discussion of Management or Tests:  None        Social Determinants of Health affecting care:   None    Disposition:  The patient was discharged to home.     Impression & Plan      Medical Decision Makin-year-old male presenting with flulike symptoms.  Has been exposed to the flu.  Suspect flu.  Patient is positive for the flu.  No signs of pneumonia on his lung exam.  Abdominal exam is benign.  No signs of significant pharyngitis or oropharyngeal abscess.  Vital signs are normal.  Recommended over-the-counter antipyretics and rest/fluids.  Discussed additional home cares and return precautions.      Diagnosis:    ICD-10-CM    1. Influenza A  J10.1            Discharge Medications:  Discharge Medication List as of 2024  3:25 PM        START taking these medications    Details   benzonatate (TESSALON) 200 MG capsule Take 1 capsule (200 mg) by mouth 3 times daily as needed for cough, Disp-10 capsule, R-0, E-Prescribe                 Scribe Disclosure:  I, Rhiannon Jean, am serving as a scribe at 2:51 PM on 1/14/2024 to document services personally performed by Sonu Fox MD based on my observations and the provider's statements to me.   1/14/2024   Sonu Fox MD Goodwin, Shaun M, MD  01/14/24 5801

## 2024-01-14 NOTE — LETTER
January 14, 2024      To Whom It May Concern:      Monica Matos was seen in our Emergency Department today, 01/14/24.  I expect his condition to improve over the next 2-3 days.  He may return to work/school when improved.    Sincerely,        Zander Vazquez RN BSN [facility]

## 2024-07-22 ENCOUNTER — HOSPITAL ENCOUNTER (EMERGENCY)
Facility: CLINIC | Age: 29
Discharge: HOME | End: 2024-07-22
Attending: EMERGENCY MEDICINE | Admitting: EMERGENCY MEDICINE
Payer: COMMERCIAL

## 2024-07-22 VITALS
TEMPERATURE: 97.9 F | HEART RATE: 59 BPM | HEIGHT: 68 IN | RESPIRATION RATE: 10 BRPM | SYSTOLIC BLOOD PRESSURE: 122 MMHG | DIASTOLIC BLOOD PRESSURE: 82 MMHG | OXYGEN SATURATION: 99 % | WEIGHT: 156.75 LBS | BODY MASS INDEX: 23.76 KG/M2

## 2024-07-22 DIAGNOSIS — F10.939 ALCOHOL WITHDRAWAL SEIZURE WITH COMPLICATION (H): ICD-10-CM

## 2024-07-22 DIAGNOSIS — R56.9 ALCOHOL WITHDRAWAL SEIZURE WITH COMPLICATION (H): ICD-10-CM

## 2024-07-22 LAB
ALBUMIN SERPL BCG-MCNC: 4.8 G/DL (ref 3.5–5.2)
ALP SERPL-CCNC: 46 U/L (ref 40–150)
ALT SERPL W P-5'-P-CCNC: 18 U/L (ref 0–70)
ANION GAP SERPL CALCULATED.3IONS-SCNC: 17 MMOL/L (ref 7–15)
AST SERPL W P-5'-P-CCNC: 25 U/L (ref 0–45)
BASOPHILS # BLD AUTO: 0 10E3/UL (ref 0–0.2)
BASOPHILS NFR BLD AUTO: 0 %
BILIRUB SERPL-MCNC: 0.6 MG/DL
BUN SERPL-MCNC: 10.1 MG/DL (ref 6–20)
CALCIUM SERPL-MCNC: 9.4 MG/DL (ref 8.8–10.4)
CHLORIDE SERPL-SCNC: 99 MMOL/L (ref 98–107)
CREAT SERPL-MCNC: 0.76 MG/DL (ref 0.51–1.17)
EGFRCR SERPLBLD CKD-EPI 2021: >90 ML/MIN/1.73M2
EOSINOPHIL # BLD AUTO: 0 10E3/UL (ref 0–0.7)
EOSINOPHIL NFR BLD AUTO: 0 %
ERYTHROCYTE [DISTWIDTH] IN BLOOD BY AUTOMATED COUNT: 13.1 % (ref 10–15)
ETHANOL SERPL-MCNC: <0.01 G/DL
GLUCOSE SERPL-MCNC: 93 MG/DL (ref 70–99)
HCO3 SERPL-SCNC: 20 MMOL/L (ref 22–29)
HCT VFR BLD AUTO: 43.8 % (ref 35–53)
HGB BLD-MCNC: 14 G/DL (ref 11.7–17.7)
HOLD SPECIMEN: NORMAL
IMM GRANULOCYTES # BLD: 0 10E3/UL
IMM GRANULOCYTES NFR BLD: 0 %
LIPASE SERPL-CCNC: 18 U/L (ref 13–60)
LYMPHOCYTES # BLD AUTO: 2 10E3/UL (ref 0.8–5.3)
LYMPHOCYTES NFR BLD AUTO: 20 %
MAGNESIUM SERPL-MCNC: 2 MG/DL (ref 1.7–2.3)
MCH RBC QN AUTO: 27.1 PG (ref 26.5–33)
MCHC RBC AUTO-ENTMCNC: 32 G/DL (ref 31.5–36.5)
MCV RBC AUTO: 85 FL (ref 78–100)
MONOCYTES # BLD AUTO: 0.7 10E3/UL (ref 0–1.3)
MONOCYTES NFR BLD AUTO: 7 %
NEUTROPHILS # BLD AUTO: 7.4 10E3/UL (ref 1.6–8.3)
NEUTROPHILS NFR BLD AUTO: 73 %
NRBC # BLD AUTO: 0 10E3/UL
NRBC BLD AUTO-RTO: 0 /100
PHOSPHATE SERPL-MCNC: 3.4 MG/DL (ref 2.5–4.5)
PLATELET # BLD AUTO: 300 10E3/UL (ref 150–450)
POTASSIUM SERPL-SCNC: 3.9 MMOL/L (ref 3.4–5.3)
PROT SERPL-MCNC: 7.9 G/DL (ref 6.4–8.3)
RBC # BLD AUTO: 5.16 10E6/UL (ref 3.8–5.9)
SODIUM SERPL-SCNC: 136 MMOL/L (ref 135–145)
WBC # BLD AUTO: 10.1 10E3/UL (ref 4–11)

## 2024-07-22 PROCEDURE — 99284 EMERGENCY DEPT VISIT MOD MDM: CPT | Mod: 25

## 2024-07-22 PROCEDURE — 250N000013 HC RX MED GY IP 250 OP 250 PS 637: Performed by: EMERGENCY MEDICINE

## 2024-07-22 PROCEDURE — 96366 THER/PROPH/DIAG IV INF ADDON: CPT

## 2024-07-22 PROCEDURE — 250N000011 HC RX IP 250 OP 636: Performed by: EMERGENCY MEDICINE

## 2024-07-22 PROCEDURE — 36415 COLL VENOUS BLD VENIPUNCTURE: CPT | Performed by: EMERGENCY MEDICINE

## 2024-07-22 PROCEDURE — 93005 ELECTROCARDIOGRAM TRACING: CPT

## 2024-07-22 PROCEDURE — 258N000003 HC RX IP 258 OP 636: Performed by: EMERGENCY MEDICINE

## 2024-07-22 PROCEDURE — 96365 THER/PROPH/DIAG IV INF INIT: CPT

## 2024-07-22 PROCEDURE — 84100 ASSAY OF PHOSPHORUS: CPT | Performed by: EMERGENCY MEDICINE

## 2024-07-22 PROCEDURE — 250N000009 HC RX 250: Performed by: EMERGENCY MEDICINE

## 2024-07-22 PROCEDURE — 96361 HYDRATE IV INFUSION ADD-ON: CPT

## 2024-07-22 PROCEDURE — 85025 COMPLETE CBC W/AUTO DIFF WBC: CPT | Performed by: EMERGENCY MEDICINE

## 2024-07-22 PROCEDURE — 82077 ASSAY SPEC XCP UR&BREATH IA: CPT | Performed by: EMERGENCY MEDICINE

## 2024-07-22 PROCEDURE — 96375 TX/PRO/DX INJ NEW DRUG ADDON: CPT

## 2024-07-22 PROCEDURE — 82040 ASSAY OF SERUM ALBUMIN: CPT | Performed by: EMERGENCY MEDICINE

## 2024-07-22 PROCEDURE — 83735 ASSAY OF MAGNESIUM: CPT | Performed by: EMERGENCY MEDICINE

## 2024-07-22 PROCEDURE — 83690 ASSAY OF LIPASE: CPT | Performed by: EMERGENCY MEDICINE

## 2024-07-22 RX ORDER — LORAZEPAM 0.5 MG/1
1-2 TABLET ORAL EVERY 30 MIN PRN
Status: DISCONTINUED | OUTPATIENT
Start: 2024-07-22 | End: 2024-07-22 | Stop reason: HOSPADM

## 2024-07-22 RX ORDER — ONDANSETRON 4 MG/1
4 TABLET, ORALLY DISINTEGRATING ORAL EVERY 8 HOURS PRN
Qty: 10 TABLET | Refills: 0 | Status: SHIPPED | OUTPATIENT
Start: 2024-07-22 | End: 2024-07-25

## 2024-07-22 RX ORDER — LORAZEPAM 2 MG/ML
1-2 INJECTION INTRAMUSCULAR EVERY 30 MIN PRN
Status: DISCONTINUED | OUTPATIENT
Start: 2024-07-22 | End: 2024-07-22 | Stop reason: HOSPADM

## 2024-07-22 RX ADMIN — FOLIC ACID: 5 INJECTION, SOLUTION INTRAMUSCULAR; INTRAVENOUS; SUBCUTANEOUS at 19:52

## 2024-07-22 RX ADMIN — LORAZEPAM 2 MG: 2 INJECTION INTRAMUSCULAR; INTRAVENOUS at 18:52

## 2024-07-22 RX ADMIN — LORAZEPAM 1 MG: 0.5 TABLET ORAL at 19:51

## 2024-07-22 RX ADMIN — SODIUM CHLORIDE 1000 ML: 9 INJECTION, SOLUTION INTRAVENOUS at 18:55

## 2024-07-22 ASSESSMENT — LIFESTYLE VARIABLES
ORIENTATION AND CLOUDING OF SENSORIUM: ORIENTED AND CAN DO SERIAL ADDITIONS
VISUAL DISTURBANCES: NOT PRESENT
PAROXYSMAL SWEATS: NO SWEAT VISIBLE
TOTAL SCORE: 5
VISUAL DISTURBANCES: NOT PRESENT
NAUSEA AND VOMITING: MILD NAUSEA WITH NO VOMITING
HEADACHE, FULLNESS IN HEAD: VERY MILD
ANXIETY: MODERATELY ANXIOUS, OR GUARDED, SO ANXIETY IS INFERRED
NAUSEA AND VOMITING: INTERMITTENT NAUSEA WITH DRY HEAVES
ANXIETY: MILDLY ANXIOUS
TOTAL SCORE: 23
AGITATION: SOMEWHAT MORE THAN NORMAL ACTIVITY
NAUSEA AND VOMITING: MILD NAUSEA WITH NO VOMITING
AUDITORY DISTURBANCES: NOT PRESENT
TOTAL SCORE: 11
AUDITORY DISTURBANCES: NOT PRESENT
PAROXYSMAL SWEATS: NO SWEAT VISIBLE
PAROXYSMAL SWEATS: BARELY PERCEPTIBLE SWEATING, PALMS MOIST
AUDITORY DISTURBANCES: NOT PRESENT
HEADACHE, FULLNESS IN HEAD: MILD
AGITATION: NORMAL ACTIVITY
VISUAL DISTURBANCES: NOT PRESENT
TREMOR: SEVERE, EVEN WITH ARMS NOT EXTENDED
ORIENTATION AND CLOUDING OF SENSORIUM: ORIENTED AND CAN DO SERIAL ADDITIONS
ORIENTATION AND CLOUDING OF SENSORIUM: ORIENTED AND CAN DO SERIAL ADDITIONS
ANXIETY: MODERATELY ANXIOUS, OR GUARDED, SO ANXIETY IS INFERRED
TREMOR: 2
TREMOR: 3
AGITATION: MODERATELY FIDGETY AND RESTLESS
HEADACHE, FULLNESS IN HEAD: MODERATE

## 2024-07-22 ASSESSMENT — COLUMBIA-SUICIDE SEVERITY RATING SCALE - C-SSRS
1. IN THE PAST MONTH, HAVE YOU WISHED YOU WERE DEAD OR WISHED YOU COULD GO TO SLEEP AND NOT WAKE UP?: NO
6. HAVE YOU EVER DONE ANYTHING, STARTED TO DO ANYTHING, OR PREPARED TO DO ANYTHING TO END YOUR LIFE?: NO
2. HAVE YOU ACTUALLY HAD ANY THOUGHTS OF KILLING YOURSELF IN THE PAST MONTH?: NO

## 2024-07-22 ASSESSMENT — ACTIVITIES OF DAILY LIVING (ADL)
ADLS_ACUITY_SCORE: 38

## 2024-07-22 NOTE — ED TRIAGE NOTES
Pt presents to the ED with alcohol withdrawal. Pt states he has a history of withdrawal and seizures, stating his last seizure was last month. Pt has tremors in hands and arms and states he can feel liver pan 6/10 in his RUQ radiating to LUQ. Pt states last drink was yesterday around 1600.

## 2024-07-22 NOTE — ED PROVIDER NOTES
"  Emergency Department Note      History of Present Illness     Chief Complaint   Withdrawal      HPI   Monica Matos is a 29 year old adult who presents to the emergency department for evaluation of alcohol withdrawal. The patient reports that he has had pain near his liver for about a year but recently it has worsened. He states that he has been vomiting for the past 2 days, at first coffee ground but states it is now only bile. He reports that it is difficult to eat or drink. He states that his last alcoholic drink was at 4 pm yesterday and he had 2 cans of four junie and 2 cans of another 9.5 ABV beverage. He states that he has had alcohol withdrawal symptoms and seizures in the past.    Independent Historian   None    Review of External Notes   1/14/24: ED note reviewed    Past Medical History     Medical History and Problem List   MDD  Borderline personality disorder  Anxiety  Umbilical hernia    Medications   Albuterol  Tessalon  Librium  Antivert  Depade  Prilosec  Depotestosterone  Zofran  Pericolace  Colace  Phenergan    Surgical History   No past surgical history.    Physical Exam     Patient Vitals for the past 24 hrs:   BP Temp Pulse Resp SpO2 Height Weight   07/22/24 2045 122/82 -- 59 10 99 % -- --   07/22/24 2030 123/86 -- 56 10 100 % -- --   07/22/24 2015 130/85 -- 60 18 99 % -- --   07/22/24 2000 133/87 -- 73 -- 100 % -- --   07/22/24 1945 130/82 -- 75 -- 100 % -- --   07/22/24 1930 126/85 -- 67 -- 100 % -- --   07/22/24 1915 (!) 133/93 -- 65 -- 100 % -- --   07/22/24 1900 128/81 -- 72 -- 99 % -- --   07/22/24 1850 (!) 150/100 -- 93 -- 97 % -- --   07/22/24 1840 (!) 158/106 -- 93 -- 100 % -- --   07/22/24 1825 (!) 140/102 97.9  F (36.6  C) 86 18 99 % 1.727 m (5' 8\") 71.1 kg (156 lb 12 oz)     Physical Exam  General: No acute distress  Head: No obvious trauma to head.  Ears, Nose, Throat:  External ears normal.  Nose normal.  No pharyngeal erythema, swelling or exudate.  Midline uvula. Moist " mucus membranes.  Eyes:  Conjunctivae clear.   Neck: Normal range of motion.  Neck supple.   CV: Regular rate and rhythm.  No murmurs.      Respiratory: Effort normal and breath sounds normal.  No wheezing or crackles.   Gastrointestinal: Soft.  No distension. There is right lower and right upper quadrant tenderness to palpation.  There is no rigidity, no rebound and no guarding.   Musculoskeletal: Normal range of motion.  Non tender extremities to palpations. No lower extremity edema  Neuro: Alert. Moving all extremities appropriately.  Normal speech. Tremulous.  Skin: Skin is warm and dry.  No rash noted.   Psych: Normal mood and affect. Behavior is normal.     Diagnostics     Lab Results   Labs Ordered and Resulted from Time of ED Arrival to Time of ED Departure   COMPREHENSIVE METABOLIC PANEL - Abnormal       Result Value    Sodium 136      Potassium 3.9      Carbon Dioxide (CO2) 20 (*)     Anion Gap 17 (*)     Urea Nitrogen 10.1      Creatinine 0.76      GFR Estimate >90      Calcium 9.4      Chloride 99      Glucose 93      Alkaline Phosphatase 46      AST 25      ALT 18      Protein Total 7.9      Albumin 4.8      Bilirubin Total 0.6     MAGNESIUM - Normal    Magnesium 2.0     PHOSPHORUS - Normal    Phosphorus 3.4     ETHYL ALCOHOL LEVEL - Normal    Alcohol ethyl <0.01     LIPASE - Normal    Lipase 18     CBC WITH PLATELETS AND DIFFERENTIAL    WBC Count 10.1      RBC Count 5.16      Hemoglobin 14.0      Hematocrit 43.8      MCV 85      MCH 27.1      MCHC 32.0      RDW 13.1      Platelet Count 300      % Neutrophils 73      % Lymphocytes 20      % Monocytes 7      % Eosinophils 0      % Basophils 0      % Immature Granulocytes 0      NRBCs per 100 WBC 0      Absolute Neutrophils 7.4      Absolute Lymphocytes 2.0      Absolute Monocytes 0.7      Absolute Eosinophils 0.0      Absolute Basophils 0.0      Absolute Immature Granulocytes 0.0      Absolute NRBCs 0.0         Imaging   No orders to display       EKG    EKG taken 07/22/24 at 18:52:38  Vent. Rate: 97 BPM  MI interval: 158 ms  QRS Duration: 70 ms  QT/QTc: 340/431 ms  P-R-T axes: 76 79 59    Interpretation:   Normal Sinus rhythm  Normal ECG  Rate increased from 50 to 97.    EKG read at 1901.    Independent Interpretation   None    ED Course      Medications Administered   Medications   LORazepam (ATIVAN) tablet 1-2 mg ( Oral Not Given 7/22/24 2045)     Or   LORazepam (ATIVAN) injection 1-2 mg ( Intravenous See Alternative 7/22/24 2045)   sodium chloride 0.9 % 1,000 mL with Infuvite Adult 10 mL, thiamine 100 mg, folic acid 1 mg infusion ( Intravenous $New Bag 7/22/24 1952)   sodium chloride 0.9% BOLUS 1,000 mL (0 mLs Intravenous Stopped 7/22/24 1945)       Procedures   None     Discussion of Management   None    ED Course   ED Course as of 07/22/24 2129 Mon Jul 22, 2024 1837 I initially assessed the patient and obtained the above history and physical exam.     2002 I rechecked with the patient. Patient is doing much better.   2052 I rechecked with the patient. Unsure on if he wants to do detox.   2128 I rechecked with the patient and prepared them for discharge.       Optional/Additional Documentation  None    Medical Decision Making / Diagnosis     CMS Diagnoses: None    MIPS       None    MDM   Monica Matos is a 29 year old adult presenting with alcohol withdrawal.  He is tremulous upon initial evaluation.  He is placed on the alcohol withdrawal scale.  2 L of IV fluids, and IV vitamins are given.  LFTs, lipase are unremarkable.  CBC, CMP, magnesium, phosphorus are also unremarkable.  Ativan is given.  Upon evaluation, the patient appears much more comfortable and reports they are feeling better.  I had a discussion with the patient regarding detox, and at first the patient was agreeable, but then later states that no one is available to watch his cat so he cannot go to detox.  He is sober and decisional, and states he does not want to stay.  I encouraged  the patient to seek out detox.  In the meantime I am giving a prescription for Zofran.  Strict return precautions are given and he verbalizes understanding.  He is discharged home in stable condition.    Disposition   The patient was discharged.     Diagnosis     ICD-10-CM    1. Alcohol withdrawal seizure with complication (H)  F10.939     R56.9            Discharge Medications   New Prescriptions    ONDANSETRON (ZOFRAN ODT) 4 MG ODT TAB    Take 1 tablet (4 mg) by mouth every 8 hours as needed for nausea or vomiting         Scribe Disclosure:  I, Sana North, am serving as a scribe at 6:40 PM on 7/22/2024 to document services personally performed by Johnathan Kuo MD based on my observations and the provider's statements to me.        Johnathan Kuo MD  07/23/24 0037

## 2024-07-23 LAB
ATRIAL RATE - MUSE: 97 BPM
DIASTOLIC BLOOD PRESSURE - MUSE: NORMAL MMHG
INTERPRETATION ECG - MUSE: NORMAL
P AXIS - MUSE: 76 DEGREES
PR INTERVAL - MUSE: 158 MS
QRS DURATION - MUSE: 70 MS
QT - MUSE: 340 MS
QTC - MUSE: 431 MS
R AXIS - MUSE: 79 DEGREES
SYSTOLIC BLOOD PRESSURE - MUSE: NORMAL MMHG
T AXIS - MUSE: 59 DEGREES
VENTRICULAR RATE- MUSE: 97 BPM

## 2024-07-23 NOTE — DISCHARGE INSTRUCTIONS
With your alcohol withdrawal, but understand you prefer to go home at this time.  We are giving you a prescription for Zofran which can be taken to help with nausea.  It was unfortunately not help with the underlying alcohol withdrawal symptoms though.  Please return the emergency department if you develop any new or concerning symptoms.